# Patient Record
Sex: MALE
[De-identification: names, ages, dates, MRNs, and addresses within clinical notes are randomized per-mention and may not be internally consistent; named-entity substitution may affect disease eponyms.]

---

## 2018-12-07 PROCEDURE — 81001 URINALYSIS AUTO W/SCOPE: CPT | Performed by: UROLOGY

## 2019-06-17 ENCOUNTER — PRIOR ORIGINAL RECORDS (OUTPATIENT)
Dept: OTHER | Age: 69
End: 2019-06-17

## 2019-10-23 ENCOUNTER — PRIOR ORIGINAL RECORDS (OUTPATIENT)
Dept: OTHER | Age: 69
End: 2019-10-23

## 2019-11-05 LAB — SERVICE CMNT-IMP: NORMAL

## 2019-11-13 LAB — GENETICS REPORT: NORMAL

## 2019-11-14 LAB
FLOW SPECIMEN SOURCE (FLWSRC): NORMAL
FLOW SPECIMEN TYPE (FLWTYP): NORMAL
SERVICE CMNT-IMP: NORMAL
SERVICE CMNT-IMP: NORMAL

## 2019-11-23 LAB — PATH REPORT, BONE MARROW PATHOLOGY: NORMAL

## 2019-12-31 ENCOUNTER — PRIOR ORIGINAL RECORDS (OUTPATIENT)
Dept: OTHER | Age: 69
End: 2019-12-31

## 2020-02-21 ENCOUNTER — HOSPITAL (OUTPATIENT)
Dept: OTHER | Age: 70
End: 2020-02-21

## 2020-02-25 ENCOUNTER — PRIOR ORIGINAL RECORDS (OUTPATIENT)
Dept: OTHER | Age: 70
End: 2020-02-25

## 2020-02-25 PROCEDURE — 99212 OFFICE O/P EST SF 10 MIN: CPT | Performed by: INTERNAL MEDICINE

## 2020-04-01 ENCOUNTER — HOSPITAL (OUTPATIENT)
Dept: OTHER | Age: 70
End: 2020-04-01

## 2020-05-01 ENCOUNTER — HOSPITAL (OUTPATIENT)
Dept: OTHER | Age: 70
End: 2020-05-01

## 2020-06-01 ENCOUNTER — HOSPITAL (OUTPATIENT)
Dept: OTHER | Age: 70
End: 2020-06-01

## 2020-07-01 ENCOUNTER — HOSPITAL (OUTPATIENT)
Dept: OTHER | Age: 70
End: 2020-07-01
Attending: INTERNAL MEDICINE

## 2020-08-05 ENCOUNTER — OFFICE VISIT (OUTPATIENT)
Dept: NEPHROLOGY | Facility: CLINIC | Age: 70
End: 2020-08-05
Payer: MEDICARE

## 2020-08-05 VITALS — SYSTOLIC BLOOD PRESSURE: 142 MMHG | WEIGHT: 165.19 LBS | BODY MASS INDEX: 27 KG/M2 | DIASTOLIC BLOOD PRESSURE: 60 MMHG

## 2020-08-05 DIAGNOSIS — N18.30 CKD (CHRONIC KIDNEY DISEASE) STAGE 3, GFR 30-59 ML/MIN (HCC): Primary | ICD-10-CM

## 2020-08-05 DIAGNOSIS — R80.9 PROTEINURIA, UNSPECIFIED TYPE: ICD-10-CM

## 2020-08-05 PROCEDURE — 99204 OFFICE O/P NEW MOD 45 MIN: CPT | Performed by: INTERNAL MEDICINE

## 2020-08-05 RX ORDER — PIOGLITAZONEHYDROCHLORIDE 15 MG/1
15 TABLET ORAL DAILY
COMMUNITY

## 2020-08-05 NOTE — PROGRESS NOTES
Nephrology Consult Note    REASON FOR CONSULT: CKD 3 / proteinuria    ASSESSMENT/PLAN:        1) CKD 3 with proteinuria- remarkably renal function and degree of proteinuria has remained unchanged since I last saw him 10 years ago; serum creatinine remained Elevated creatinine    Aide Avilez DO    Very pleasant 56-year-old male with a history of well-controlled diabetes hypertension who presents for reevaluation of chronic kidney disease;  I saw about 10 years ago and his serum creatinine was 2.0 mg/dL accompa Allergies:  No Known Allergies    Social History:  Social History    Socioeconomic History      Marital status:       Spouse name: Not on file      Number of children: Not on file      Years of education: Not on file      Highest education lev

## 2020-08-06 ENCOUNTER — OFFICE VISIT (OUTPATIENT)
Dept: PODIATRY CLINIC | Facility: CLINIC | Age: 70
End: 2020-08-06
Payer: MEDICARE

## 2020-08-06 DIAGNOSIS — M21.622 TAILOR'S BUNION OF BOTH FEET: Primary | ICD-10-CM

## 2020-08-06 DIAGNOSIS — E11.9 TYPE 2 DIABETES MELLITUS WITHOUT COMPLICATION, WITHOUT LONG-TERM CURRENT USE OF INSULIN (HCC): ICD-10-CM

## 2020-08-06 DIAGNOSIS — M21.621 TAILOR'S BUNION OF BOTH FEET: Primary | ICD-10-CM

## 2020-08-06 PROCEDURE — 99203 OFFICE O/P NEW LOW 30 MIN: CPT | Performed by: PODIATRIST

## 2020-08-06 NOTE — PROGRESS NOTES
Liane Bruce is a 72year old male. Patient presents with:  New Patient: Diabetic type 2 - Last A1C of 5.4 on 6/15/20 - LOV w/ PCP 6/15/20 - painful corn left foot - pain scale 7/10.         HPI:     Presents today as this painful lesion on both feet left is w No          REVIEW OF SYSTEMS:   Review of Systems    Today reviewed systems as documented below  GENERAL HEALTH: feels well otherwise  SKIN: denies any unusual skin lesions or rashes  RESPIRATORY: denies shortness of breath with exertion  CARDIOVASCULAR:

## 2020-10-09 LAB
% SATURATION: 34 % (CALC) (ref 20–48)
ABNORMAL PROTEIN BAND: 0.6 G/DL
ABNORMAL PROTEIN BAND: 0.9 G/DL
ALBUMIN/GLOB SERPL: 1.2 (CALC) (ref 1–2.5)
ALBUMIN/GLOB SERPL: 1.5 (CALC) (ref 1–2.5)
ALBUMIN: 3.4 G/DL (ref 3.8–4.8)
ALBUMIN: 3.5 G/DL (ref 3.6–5.1)
ALBUMIN: 3.7 G/DL (ref 3.8–4.8)
ALBUMIN: 3.8 G/DL (ref 3.6–5.1)
ALKALINE PHOSPHATASE: 38 UNIT/L (ref 40–115)
ALKALINE PHOSPHATASE: 41 UNIT/L (ref 40–115)
ALPHA-1-GLOBULINS: 0.2 G/DL (ref 0.2–0.3)
ALPHA-1-GLOBULINS: 0.4 G/DL (ref 0.2–0.3)
ALPHA-2-GLOBULINS: 0.6 G/DL (ref 0.5–0.9)
ALPHA-2-GLOBULINS: 0.9 G/DL (ref 0.5–0.9)
ALT: 11 UNIT/L (ref 9–46)
ALT: 6 UNIT/L (ref 9–46)
AST: 11 UNIT/L (ref 10–35)
AST: 16 UNIT/L
BASO%: 0.2 %
BASO%: 0.5 %
BASO%: 0.6 %
BASO: 0 10^3/UL
BETA 1 GLOBULIN: 0.4 G/DL (ref 0.4–0.6)
BETA 1 GLOBULIN: 0.4 G/DL (ref 0.4–0.6)
BETA 2 GLOBULIN: 0.3 G/DL (ref 0.2–0.5)
BETA 2 GLOBULIN: 0.3 G/DL (ref 0.2–0.5)
BILIRUBIN, TOTAL: 0.4 MG/DL (ref 0.2–1.2)
BILIRUBIN, TOTAL: 0.6 MG/DL (ref 0.2–1.2)
BUN/CREATININE RATIO: 15 (CALC) (ref 6–22)
BUN/CREATININE RATIO: 18 (CALC) (ref 6–22)
CALCIUM: 9.5 MG/DL (ref 8.6–10.3)
CALCIUM: 9.5 MG/DL (ref 8.6–10.3)
CARBON DIOXIDE: 24 MMOL/L (ref 20–32)
CARBON DIOXIDE: 27 MMOL/L (ref 20–32)
CHLORIDE: 106 MMOL/L (ref 98–110)
CHLORIDE: 107 MMOL/L (ref 98–110)
CRCL (C&G) (MOSAIQ HL): 39.72 ML/MIN
CRCL (C&G) (MOSAIQ HL): 42.78 ML/MIN
CREATININE CLEARANCE (MOSAIQ HL): 32.2 ML/MIN
CREATININE CLEARANCE (MOSAIQ HL): 33.9 ML/MIN
CREATININE: 1.76 MG/DL (ref 0.7–1.25)
CREATININE: 1.85 MG/DL (ref 0.7–1.25)
EGFR AFRICAN AMERICAN: 42 ML/MIN/1.73M2
EGFR AFRICAN AMERICAN: 45 ML/MIN/1.73M2
EGFR NON-AFR. AMERICAN: 37 ML/MIN/1.73M2
EGFR NON-AFR. AMERICAN: 39 ML/MIN/1.73M2
EOS%: 0.7 %
EOS%: 2.3 %
EOS%: 4.2 %
EOS: 0.1 10^3/UL
EOS: 0.1 10^3/UL
EOS: 0.3 10^3/UL
FREE KAPPA, SERUM: 17.3 MG/L (ref 3.3–19.4)
FREE KAPPA, SERUM: 17.7 MG/L (ref 3.3–19.4)
FREE KAPPA/LAMBDA RATIO: 0.83 (ref 0.26–1.65)
FREE KAPPA/LAMBDA RATIO: 0.85 (ref 0.26–1.65)
FREE LAMBDA, SERUM: 20.4 MG/L (ref 5.7–26.3)
FREE LAMBDA, SERUM: 21.3 MG/L (ref 5.7–26.3)
GAMMA GLOBULINS: 1.1 G/DL (ref 0.8–1.7)
GAMMA GLOBULINS: 1.2 G/DL (ref 0.8–1.7)
GLOBULIN: 2.5 G/DL (CALC) (ref 1.9–3.7)
GLOBULIN: 3 G/DL (CALC) (ref 1.9–3.7)
GLUCOSE: 140 MG/DL (ref 65–99)
GLUCOSE: 98 MG/DL (ref 65–99)
HCT: 32.8 % (ref 38–54)
HCT: 34.6 % (ref 38–54)
HCT: 34.9 % (ref 38–54)
HGB: 10.6 G/DL (ref 12–18)
HGB: 11.3 G/DL (ref 12–18)
HGB: 11.4 G/DL (ref 12–18)
IG A: 136 MG/DL (ref 20–320)
IG G: 1347 MG/DL (ref 600–1540)
IG M: 35 MG/DL (ref 50–300)
INTERPRETATION: ABNORMAL
INTERPRETATION: ABNORMAL
INTERPRETATION: NORMAL
IRON BINDING CAPACITY: 334 MCG/DL (CALC) (ref 250–425)
IRON, TOTAL: 115 MCG/DL (ref 50–180)
LYMPH%: 22.8 % (ref 12–44)
LYMPH%: 29.4 % (ref 12–44)
LYMPH%: 9.2 % (ref 12–44)
LYMPH: 1 10^3/UL (ref 0.8–2.8)
LYMPH: 1.4 10^3/UL (ref 0.8–2.8)
LYMPH: 1.6 10^3/UL (ref 0.8–2.8)
MCH: 30.6 PG (ref 26–33)
MCH: 30.9 PG (ref 26–33)
MCH: 31 PG (ref 26–33)
MCHC: 32.3 G/DL (ref 31–36)
MCHC: 32.4 G/DL (ref 31–36)
MCHC: 32.9 G/DL (ref 31–36)
MCV: 94 FML (ref 82–100)
MCV: 94.6 FML (ref 82–100)
MCV: 95.6 FML (ref 82–100)
MONO%: 8 % (ref 2–12)
MONO%: 8.5 % (ref 2–12)
MONO%: 9.6 % (ref 2–12)
MONO: 0.5 10^3/UL (ref 0.2–1)
MONO: 0.6 10^3/UL (ref 0.2–1)
MONO: 0.9 10^3/UL (ref 0.2–1)
MPV: 9.4 FML (ref 8.6–11.7)
MPV: 9.5 FML (ref 8.6–11.7)
MPV: 9.7 FML (ref 8.6–11.7)
NEUT%: 59.2 % (ref 47–76)
NEUT%: 62.9 % (ref 47–76)
NEUT%: 81.9 % (ref 47–76)
NEUT: 3.2 10^3/UL (ref 1.5–7.1)
NEUT: 3.7 10^3/UL (ref 1.5–7.1)
NEUT: 9.2 10^3/UL (ref 1.5–7.1)
PLT: 308 10^3/UL (ref 150–375)
PLT: 324 10^3/UL (ref 150–375)
PLT: 355 10^3/UL (ref 150–375)
POTASSIUM: 4.3 MMOL/L (ref 3.5–5.3)
POTASSIUM: 4.5 MMOL/L (ref 3.5–5.3)
PROTEIN, TOTAL: 6.3 G/DL (ref 6.1–8.1)
PROTEIN, TOTAL: 6.3 G/DL (ref 6.1–8.1)
PROTEIN, TOTAL: 6.5 G/DL (ref 6.1–8.1)
PROTEIN, TOTAL: 6.5 G/DL (ref 6.1–8.1)
RBC: 3.43 10^6/UL (ref 4.2–6.2)
RBC: 3.68 10^6/UL (ref 4.2–6.2)
RBC: 3.69 10^6/UL (ref 4.2–6.2)
RDW-CV: 13.3 %
RDW-CV: 13.6 %
RDW-CV: 13.9 %
RDW-SD: 44.2 FML (ref 36–50)
RDW-SD: 45.1 FML (ref 36–50)
RDW-SD: 45.8 FML (ref 36–50)
RETICULOCYTE COUNT,$AUTOMATED: 0.9 %
RETICULOCYTE, ABSOLUTE: NORMAL CELLS/UL (ref 25000–90000)
SODIUM: 140 MMOL/L (ref 135–146)
SODIUM: 140 MMOL/L (ref 135–146)
UREA NITROGEN (BUN): 27 MG/DL (ref 7–25)
UREA NITROGEN (BUN): 31 MG/DL (ref 7–25)
WBC: 11.3 10^3/UL (ref 4.3–11)
WBC: 5.3 10^3/UL (ref 4.3–11)
WBC: 5.9 10^3/UL (ref 4.3–11)

## 2020-10-11 VITALS
DIASTOLIC BLOOD PRESSURE: 62 MMHG | WEIGHT: 167.2 LBS | BODY MASS INDEX: 26.87 KG/M2 | HEIGHT: 66 IN | SYSTOLIC BLOOD PRESSURE: 128 MMHG

## 2020-10-11 VITALS
BODY MASS INDEX: 26.68 KG/M2 | HEIGHT: 66 IN | DIASTOLIC BLOOD PRESSURE: 74 MMHG | WEIGHT: 166.01 LBS | SYSTOLIC BLOOD PRESSURE: 141 MMHG

## 2020-10-11 VITALS — SYSTOLIC BLOOD PRESSURE: 139 MMHG | DIASTOLIC BLOOD PRESSURE: 74 MMHG | WEIGHT: 161.99 LBS

## 2020-12-31 ENCOUNTER — PRIOR ORIGINAL RECORDS (OUTPATIENT)
Dept: OTHER | Age: 70
End: 2020-12-31

## 2021-01-01 ENCOUNTER — EXTERNAL RECORD (OUTPATIENT)
Dept: INFUSION THERAPY | Age: 71
End: 2021-01-01

## 2021-01-29 ENCOUNTER — MED REC SCAN ONLY (OUTPATIENT)
Dept: NEPHROLOGY | Facility: CLINIC | Age: 71
End: 2021-01-29

## 2021-03-08 DIAGNOSIS — Z23 NEED FOR VACCINATION: ICD-10-CM

## 2021-07-22 ENCOUNTER — MED REC SCAN ONLY (OUTPATIENT)
Dept: NEPHROLOGY | Facility: CLINIC | Age: 71
End: 2021-07-22

## 2021-08-22 ENCOUNTER — LAB ENCOUNTER (OUTPATIENT)
Dept: LAB | Facility: HOSPITAL | Age: 71
End: 2021-08-22
Attending: INTERNAL MEDICINE
Payer: MEDICARE

## 2021-08-22 DIAGNOSIS — Z01.818 PREOP TESTING: ICD-10-CM

## 2021-08-23 LAB — SARS-COV-2 RNA RESP QL NAA+PROBE: NOT DETECTED

## 2021-08-25 PROBLEM — Z86.010 HISTORY OF ADENOMATOUS POLYP OF COLON: Status: ACTIVE | Noted: 2021-08-25

## 2021-08-25 PROBLEM — Z86.0101 HISTORY OF ADENOMATOUS POLYP OF COLON: Status: ACTIVE | Noted: 2021-08-25

## 2022-11-21 ENCOUNTER — OFFICE VISIT (OUTPATIENT)
Dept: NEPHROLOGY | Facility: CLINIC | Age: 72
End: 2022-11-21
Payer: MEDICARE

## 2022-11-21 VITALS — DIASTOLIC BLOOD PRESSURE: 48 MMHG | SYSTOLIC BLOOD PRESSURE: 116 MMHG | WEIGHT: 153.5 LBS | BODY MASS INDEX: 25 KG/M2

## 2022-11-21 DIAGNOSIS — N05.9 GLOMERULONEPHRITIS: ICD-10-CM

## 2022-11-21 DIAGNOSIS — R80.9 PROTEINURIA, UNSPECIFIED TYPE: ICD-10-CM

## 2022-11-21 DIAGNOSIS — N18.30 STAGE 3 CHRONIC KIDNEY DISEASE, UNSPECIFIED WHETHER STAGE 3A OR 3B CKD (HCC): Primary | ICD-10-CM

## 2023-08-04 ENCOUNTER — HOSPITAL ENCOUNTER (OUTPATIENT)
Dept: ULTRASOUND IMAGING | Age: 73
Discharge: HOME | End: 2023-08-04
Attending: PHYSICIAN ASSISTANT
Payer: MEDICARE

## 2023-08-04 DIAGNOSIS — I10 ESSENTIAL HYPERTENSION: ICD-10-CM

## 2023-08-04 PROCEDURE — 93975 VASCULAR STUDY: CPT | Performed by: PHYSICIAN ASSISTANT

## 2023-08-04 PROCEDURE — 76775 US EXAM ABDO BACK WALL LIM: CPT | Performed by: PHYSICIAN ASSISTANT

## 2023-09-20 ENCOUNTER — OFFICE VISIT (OUTPATIENT)
Dept: NEPHROLOGY | Facility: CLINIC | Age: 73
End: 2023-09-20
Payer: MEDICARE

## 2023-09-20 VITALS — DIASTOLIC BLOOD PRESSURE: 62 MMHG | SYSTOLIC BLOOD PRESSURE: 150 MMHG | WEIGHT: 159 LBS | BODY MASS INDEX: 26 KG/M2

## 2023-09-20 DIAGNOSIS — I10 PRIMARY HYPERTENSION: ICD-10-CM

## 2023-09-20 DIAGNOSIS — R80.9 PROTEINURIA, UNSPECIFIED TYPE: ICD-10-CM

## 2023-09-20 DIAGNOSIS — N05.9 GLOMERULONEPHRITIS: ICD-10-CM

## 2023-09-20 DIAGNOSIS — N18.30 STAGE 3 CHRONIC KIDNEY DISEASE, UNSPECIFIED WHETHER STAGE 3A OR 3B CKD (HCC): Primary | ICD-10-CM

## 2023-09-20 PROCEDURE — 99214 OFFICE O/P EST MOD 30 MIN: CPT | Performed by: INTERNAL MEDICINE

## 2023-09-20 RX ORDER — AMLODIPINE BESYLATE 10 MG/1
10 TABLET ORAL DAILY
Qty: 30 TABLET | Refills: 1 | Status: SHIPPED | OUTPATIENT
Start: 2023-09-20

## 2023-09-20 RX ORDER — VALSARTAN AND HYDROCHLOROTHIAZIDE 320; 25 MG/1; MG/1
1 TABLET, FILM COATED ORAL DAILY
Qty: 30 TABLET | Refills: 11 | Status: SHIPPED | OUTPATIENT
Start: 2023-09-20

## 2023-11-02 RX ORDER — AMLODIPINE BESYLATE 10 MG/1
10 TABLET ORAL DAILY
Qty: 90 TABLET | Refills: 1 | Status: SHIPPED | OUTPATIENT
Start: 2023-11-02

## 2023-12-13 ENCOUNTER — TELEPHONE (OUTPATIENT)
Dept: HEMATOLOGY/ONCOLOGY | Facility: HOSPITAL | Age: 73
End: 2023-12-13

## 2023-12-16 ENCOUNTER — PATIENT MESSAGE (OUTPATIENT)
Dept: NEPHROLOGY | Facility: CLINIC | Age: 73
End: 2023-12-16

## 2023-12-16 DIAGNOSIS — N18.4 CKD (CHRONIC KIDNEY DISEASE) STAGE 4, GFR 15-29 ML/MIN (HCC): Primary | ICD-10-CM

## 2023-12-16 DIAGNOSIS — N17.9 AKI (ACUTE KIDNEY INJURY) (HCC): ICD-10-CM

## 2023-12-29 ENCOUNTER — OFFICE VISIT (OUTPATIENT)
Dept: HEMATOLOGY/ONCOLOGY | Facility: HOSPITAL | Age: 73
End: 2023-12-29
Attending: INTERNAL MEDICINE
Payer: MEDICARE

## 2023-12-29 VITALS — DIASTOLIC BLOOD PRESSURE: 62 MMHG | SYSTOLIC BLOOD PRESSURE: 148 MMHG | HEART RATE: 76 BPM

## 2023-12-29 VITALS
RESPIRATION RATE: 18 BRPM | WEIGHT: 159.63 LBS | OXYGEN SATURATION: 99 % | DIASTOLIC BLOOD PRESSURE: 67 MMHG | SYSTOLIC BLOOD PRESSURE: 153 MMHG | TEMPERATURE: 97 F | HEART RATE: 88 BPM | BODY MASS INDEX: 26 KG/M2

## 2023-12-29 DIAGNOSIS — D63.1 ANEMIA DUE TO STAGE 4 CHRONIC KIDNEY DISEASE  (HCC): ICD-10-CM

## 2023-12-29 DIAGNOSIS — D50.8 IRON DEFICIENCY ANEMIA SECONDARY TO INADEQUATE DIETARY IRON INTAKE: Primary | ICD-10-CM

## 2023-12-29 DIAGNOSIS — N18.4 ANEMIA DUE TO STAGE 4 CHRONIC KIDNEY DISEASE  (HCC): ICD-10-CM

## 2023-12-29 DIAGNOSIS — N18.4 CKD (CHRONIC KIDNEY DISEASE) STAGE 4, GFR 15-29 ML/MIN (HCC): ICD-10-CM

## 2023-12-29 PROCEDURE — 96374 THER/PROPH/DIAG INJ IV PUSH: CPT

## 2023-12-29 PROCEDURE — 99205 OFFICE O/P NEW HI 60 MIN: CPT | Performed by: INTERNAL MEDICINE

## 2023-12-29 RX ORDER — KETOTIFEN FUMARATE 0.35 MG/ML
SOLUTION/ DROPS OPHTHALMIC
COMMUNITY

## 2023-12-29 NOTE — PROGRESS NOTES
Education Record    Learner:  Patient and Spouse    Disease / Diagnosis: iron deficiency anemia    Barriers / Limitations:  None   Comments:    Method:  Discussion and Printed material   Comments:    General Topics:  Medication, Side effects and symptom management, Plan of care reviewed, and Fall risk and prevention   Comments:    Outcome:  Shows understanding   Comments:    Pt here for injectafer iron infusion. Tolerated well. Pt to return next week for second dose, then have labs completed in 3 months outpatient. Pt and spouse provided with list of outpatient labs. Verbalized understanding of plan of care. Discharged in stable condition.

## 2023-12-29 NOTE — PROGRESS NOTES
Education Record    Learner:  Patient    Disease / Diagnosis:iron deficiency anemia    Barriers / Limitations:  None   Comments:    Method:  Discussion   Comments:    General Topics:  Plan of care reviewed   Comments:    Outcome:  Shows understanding   Comments:    Patient here as a new consult. States energy levels are low. Feels cold often. Denies any dyspnea, lightheadedness, bleeding, or bruising. States he cannot tolerate oral iron.

## 2024-01-02 NOTE — TELEPHONE ENCOUNTER
From: Maile Vincent  To: Miguel Rees  Sent: 12/16/2023 6:34 AM CST  Subject: Labs    Mr. Rees-     Hope you are well. Your labs show mild anemia and lower iron levels (common). Your kidney function (creatinine) is higher (worse). Have there been any recent additions to your medications ie diuretics, jardiance / farxiga, etc?    Pwaan Vincent

## 2024-01-05 ENCOUNTER — OFFICE VISIT (OUTPATIENT)
Dept: HEMATOLOGY/ONCOLOGY | Facility: HOSPITAL | Age: 74
End: 2024-01-05
Attending: INTERNAL MEDICINE
Payer: MEDICARE

## 2024-01-05 VITALS
OXYGEN SATURATION: 100 % | DIASTOLIC BLOOD PRESSURE: 77 MMHG | SYSTOLIC BLOOD PRESSURE: 128 MMHG | HEART RATE: 59 BPM | TEMPERATURE: 97 F | RESPIRATION RATE: 16 BRPM

## 2024-01-05 DIAGNOSIS — D50.8 IRON DEFICIENCY ANEMIA SECONDARY TO INADEQUATE DIETARY IRON INTAKE: Primary | ICD-10-CM

## 2024-01-05 PROCEDURE — 96374 THER/PROPH/DIAG INJ IV PUSH: CPT

## 2024-01-05 NOTE — PROGRESS NOTES
Pt here for iron infusion.  Arrives Ambulating independently, accompanied by Other self           Modifications in dose or schedule: No     Frequency of blood return and site check throughout administration: Prior to administration   Discharged to Home, Ambulating independently, accompanied by:Other self    Outpatient Oncology Care Plan  Problem list:  anemia  Problems related to:    disease/disease progression  Interventions:  administered iron  Expected outcomes:  optimal lab values  Progress towards outcome:  making progress    Education Record    Learner:  Patient  Barriers / Limitations:  None  Method:  Brief focused  Outcome:  Shows understanding  Comments:  Here for second dose of injectafer. Tolerated well. Lab appt scheduled for three months from today.

## 2024-01-10 ENCOUNTER — LAB ENCOUNTER (OUTPATIENT)
Dept: LAB | Age: 74
End: 2024-01-10
Attending: INTERNAL MEDICINE
Payer: MEDICARE

## 2024-01-10 LAB
ANION GAP SERPL CALC-SCNC: 7 MMOL/L (ref 0–18)
BUN BLD-MCNC: 71 MG/DL (ref 9–23)
CALCIUM BLD-MCNC: 8.7 MG/DL (ref 8.5–10.1)
CHLORIDE SERPL-SCNC: 104 MMOL/L (ref 98–112)
CO2 SERPL-SCNC: 24 MMOL/L (ref 21–32)
CREAT BLD-MCNC: 3.28 MG/DL
EGFRCR SERPLBLD CKD-EPI 2021: 19 ML/MIN/1.73M2 (ref 60–?)
FASTING STATUS PATIENT QL REPORTED: NO
GLUCOSE BLD-MCNC: 96 MG/DL (ref 70–99)
OSMOLALITY SERPL CALC.SUM OF ELEC: 301 MOSM/KG (ref 275–295)
POTASSIUM SERPL-SCNC: 4.6 MMOL/L (ref 3.5–5.1)
SODIUM SERPL-SCNC: 135 MMOL/L (ref 136–145)

## 2024-01-10 PROCEDURE — 36415 COLL VENOUS BLD VENIPUNCTURE: CPT | Performed by: INTERNAL MEDICINE

## 2024-01-10 PROCEDURE — 80048 BASIC METABOLIC PNL TOTAL CA: CPT | Performed by: INTERNAL MEDICINE

## 2024-01-17 ENCOUNTER — OFFICE VISIT (OUTPATIENT)
Dept: NEPHROLOGY | Facility: CLINIC | Age: 74
End: 2024-01-17
Payer: MEDICARE

## 2024-01-17 VITALS — WEIGHT: 152.38 LBS | BODY MASS INDEX: 25 KG/M2 | DIASTOLIC BLOOD PRESSURE: 56 MMHG | SYSTOLIC BLOOD PRESSURE: 110 MMHG

## 2024-01-17 DIAGNOSIS — N17.9 AKI (ACUTE KIDNEY INJURY) (HCC): Primary | ICD-10-CM

## 2024-01-17 DIAGNOSIS — N18.30 STAGE 3 CHRONIC KIDNEY DISEASE, UNSPECIFIED WHETHER STAGE 3A OR 3B CKD (HCC): ICD-10-CM

## 2024-01-17 DIAGNOSIS — I10 PRIMARY HYPERTENSION: ICD-10-CM

## 2024-01-17 PROCEDURE — 99214 OFFICE O/P EST MOD 30 MIN: CPT | Performed by: INTERNAL MEDICINE

## 2024-01-17 RX ORDER — PREDNISOLONE ACETATE 10 MG/ML
SUSPENSION/ DROPS OPHTHALMIC
COMMUNITY

## 2024-01-17 NOTE — PROGRESS NOTES
Nephrology Progress Note      ASSESSMENT/PLAN:      1) CKD 3 with proteinuria- longstanding CKD with initial Cr 2.0 mg/dl 2011 accompanied by modest subnephrotic range proteinuria has been relatively stable; most recent creatinine level was 2.3 mg/dL.  This is most consistent with either prior insult such as postinfectious glomerulonephritis or a low-grade glomerulopathy such as membranous glomerulonephritis or FSGS.  There is no evidence of a nephritic process given his consistently bland urine sediment.  Previous evaluation with serologies and monoclonal protein study was unrevealing.  He also underwent bone marrow biopsy by Dr. Marks in 2020 which did not reveal a plasma cell dyscrasia.  He does not have other risk factors for kidney disease and has well-controlled hypertension and diabetes.  Meds are benign without chronic analgesic or PPI use.  There is no history of obstructive uropathy. PLAN- d/w pt and wife at length. Recently higher Cr likely due to addition of thiazide (losartan -> valsartan-thiazide); other meds unchanged. No other acute insults. Will recheck labs next week after holding thiazide x 3-4 days and defer further w/u if Cr returns to baseline.     2) DM 2- well controlled on actos with A1C < 6. I do not feel he has an absolute need to switch from Actos to SGLT2 given stable kidney function, lack of diabetic nephropathy, and excellent DM mgmt- particularly given the cost.     3) HTN- BP significantly better with valsartan-thiazide- resume pending above. Continue usual amlodipine 10 mg daily        HPI:   Miguel Rees is a 73 year old male who presents for follow-up of   Chief Complaint   Patient presents with    Chronic Kidney Disease     Ref'd back by Toro Fragoso PA-C for elevated Cr    Hypertension       Very pleasant 73-year-old male presents for follow-up of chronic kidney disease; send serum creatinine higher to 3.2 mg/dL range.  No changes medications was switching from losartan to  valsartan plus hydrochlorothiazide about 3 months ago for BP management.  Denies any recent NSAID use.  No obstructive symptoms.  No other changes in medications.  Otherwise feels fine.    HISTORY:  Past Medical History:   Diagnosis Date    Anemia 2019    Diabetes mellitus (HCC)     Disorder of prostate 2010    enlarged prostate    Glaucoma 2010    minor    Hearing loss     High cholesterol     Type II or unspecified type diabetes mellitus without mention of complication, not stated as uncontrolled     Unspecified essential hypertension     Wears glasses       Past Surgical History:   Procedure Laterality Date    CHOLECYSTECTOMY  11/2001    COLONOSCOPY  08/24/2019    REMOVAL GALLBLADDER        Family History   Problem Relation Age of Onset    Diabetes Father       Social History:   Social History     Socioeconomic History    Marital status:    Tobacco Use    Smoking status: Never    Smokeless tobacco: Never   Vaping Use    Vaping Use: Never used   Substance and Sexual Activity    Alcohol use: No    Drug use: No        Medications (Active prior to today's visit):  Current Outpatient Medications   Medication Sig Dispense Refill    prednisoLONE 1 % Ophthalmic Suspension INSTILL 1 DROP IN SURGERY EYE 4 TIMES DAILY AFTER SURGERY FOR 1 WEEK, THEN 1 DROP IN SURGERY EYE THREE TIMES DAILY FOR 1 WEEK, THEN 1 DROP IN SURGERY EYE 2 TIMES DAILY FOR 1 WEEK ,THEN 1 DROP IN SURGERY EYE ONCE DAILY FOR 1 MORE WEEK.      amLODIPine 10 MG Oral Tab Take 1 tablet (10 mg total) by mouth daily. 90 tablet 1    Valsartan-hydroCHLOROthiazide 320-25 MG Oral Tab Take 1 tablet by mouth daily. 30 tablet 11    Pioglitazone HCl 15 MG Oral Tab Take 1 tablet (15 mg total) by mouth daily.      Fluticasone Propionate 50 MCG/ACT Nasal Suspension by Each Nare route daily.      Cholecalciferol (VITAMIN D) 1000 units Oral Tab Take by mouth. 2000mg       Vitamin B-12 1000 MCG Oral Tab Take 1 tablet (1,000 mcg total) by mouth daily.      aspirin 81 MG  Oral Tab Take 1 tablet (81 mg total) by mouth daily.      fenofibrate micronized (LOFIBRA) 134 MG Oral Cap Take 1 capsule (134 mg total) by mouth daily with breakfast.      ketotifen (ALAWAY) 0.035 % Ophthalmic Solution  (Patient not taking: Reported on 1/17/2024)         Allergies:  No Known Allergies    ROS:     Denies fever/chills  Denies wt loss/gain  Denies HA or visual changes  Denies CP or palpitations  Denies SOB/cough/hemoptysis  Denies abd or flank pain  Denies N/V/D  Denies change in urinary habits or gross hematuria  Denies LE edema  Denies skin rashes/myalgias/arthralgias      PHYSICAL EXAM:   /56 (BP Location: Left arm, Patient Position: Sitting)   Wt 152 lb 6 oz (69.1 kg)   BMI 24.59 kg/m²   Wt Readings from Last 3 Encounters:   01/17/24 152 lb 6 oz (69.1 kg)   12/29/23 159 lb 9.6 oz (72.4 kg)   09/20/23 159 lb (72.1 kg)     General: Alert and oriented in no apparent distress.  HEENT: No scleral icterus, MMM  Neck: Supple, no DEANNE or thyromegaly  Cardiac: Regular rate and rhythm, S1, S2 normal, no murmur or rub  Lungs: Clear without wheezes, rales, rhonchi.    Abdomen: Soft, non-tender. + bowel sounds, no palpable organomegaly  Extremities: Without clubbing, cyanosis or edema.  Neurologic: Alert and oriented, normal affect, cranial nerves grossly intact, moving all extremities  Skin: Warm and dry, no rashes      Maile Vincent MD  1/17/2024  236 PM

## 2024-01-31 ENCOUNTER — LAB ENCOUNTER (OUTPATIENT)
Dept: LAB | Age: 74
End: 2024-01-31
Attending: INTERNAL MEDICINE
Payer: MEDICARE

## 2024-01-31 DIAGNOSIS — N17.9 AKI (ACUTE KIDNEY INJURY) (HCC): ICD-10-CM

## 2024-01-31 DIAGNOSIS — N18.30 STAGE 3 CHRONIC KIDNEY DISEASE, UNSPECIFIED WHETHER STAGE 3A OR 3B CKD (HCC): ICD-10-CM

## 2024-01-31 DIAGNOSIS — I10 PRIMARY HYPERTENSION: ICD-10-CM

## 2024-01-31 LAB
ANION GAP SERPL CALC-SCNC: 5 MMOL/L (ref 0–18)
BILIRUB UR QL STRIP.AUTO: NEGATIVE
BUN BLD-MCNC: 49 MG/DL (ref 9–23)
CALCIUM BLD-MCNC: 9.1 MG/DL (ref 8.5–10.1)
CHLORIDE SERPL-SCNC: 108 MMOL/L (ref 98–112)
CLARITY UR REFRACT.AUTO: CLEAR
CO2 SERPL-SCNC: 26 MMOL/L (ref 21–32)
COLOR UR AUTO: COLORLESS
CREAT BLD-MCNC: 2.9 MG/DL
CREAT UR-SCNC: 21.4 MG/DL
EGFRCR SERPLBLD CKD-EPI 2021: 22 ML/MIN/1.73M2 (ref 60–?)
FASTING STATUS PATIENT QL REPORTED: NO
GLUCOSE BLD-MCNC: 100 MG/DL (ref 70–99)
GLUCOSE UR STRIP.AUTO-MCNC: 50 MG/DL
KETONES UR STRIP.AUTO-MCNC: NEGATIVE MG/DL
LEUKOCYTE ESTERASE UR QL STRIP.AUTO: NEGATIVE
NITRITE UR QL STRIP.AUTO: NEGATIVE
OSMOLALITY SERPL CALC.SUM OF ELEC: 301 MOSM/KG (ref 275–295)
PH UR STRIP.AUTO: 6 [PH] (ref 5–8)
POTASSIUM SERPL-SCNC: 5 MMOL/L (ref 3.5–5.1)
PROT UR STRIP.AUTO-MCNC: 20 MG/DL
PROT UR-MCNC: 31.9 MG/DL
RBC UR QL AUTO: NEGATIVE
SODIUM SERPL-SCNC: 139 MMOL/L (ref 136–145)
SP GR UR STRIP.AUTO: <1.005 (ref 1–1.03)
UROBILINOGEN UR STRIP.AUTO-MCNC: NORMAL MG/DL

## 2024-01-31 PROCEDURE — 82570 ASSAY OF URINE CREATININE: CPT

## 2024-01-31 PROCEDURE — 84156 ASSAY OF PROTEIN URINE: CPT

## 2024-01-31 PROCEDURE — 80048 BASIC METABOLIC PNL TOTAL CA: CPT | Performed by: INTERNAL MEDICINE

## 2024-01-31 PROCEDURE — 36415 COLL VENOUS BLD VENIPUNCTURE: CPT | Performed by: INTERNAL MEDICINE

## 2024-01-31 PROCEDURE — 81001 URINALYSIS AUTO W/SCOPE: CPT

## 2024-02-07 ENCOUNTER — PATIENT MESSAGE (OUTPATIENT)
Dept: NEPHROLOGY | Facility: CLINIC | Age: 74
End: 2024-02-07

## 2024-02-07 DIAGNOSIS — N18.4 CKD (CHRONIC KIDNEY DISEASE) STAGE 4, GFR 15-29 ML/MIN (HCC): Primary | ICD-10-CM

## 2024-02-08 NOTE — TELEPHONE ENCOUNTER
From: Miguel Rees  To: Maile Vincent  Sent: 2/7/2024 6:38 PM CST  Subject: Blood pressure increasing     Hi Dr Vincent, I have been off the valsartan since 1/28. My blood pressure was ok the week of 1/28. This week my blood pressure has been increasing mostly in the 138/61 range in the morning and 145/91 in the evenings. You said to stay off the valsartan for two weeks from my email to you on 2/1. Should I continue for another week off the valsartan or should I start the valsartan back up since my blood pressure is increasing. Thanks, Miguel Rees

## 2024-02-29 ENCOUNTER — LAB ENCOUNTER (OUTPATIENT)
Dept: LAB | Age: 74
End: 2024-02-29
Attending: INTERNAL MEDICINE
Payer: MEDICARE

## 2024-02-29 LAB
ANION GAP SERPL CALC-SCNC: 1 MMOL/L (ref 0–18)
BUN BLD-MCNC: 61 MG/DL (ref 9–23)
CALCIUM BLD-MCNC: 9.1 MG/DL (ref 8.5–10.1)
CHLORIDE SERPL-SCNC: 109 MMOL/L (ref 98–112)
CO2 SERPL-SCNC: 26 MMOL/L (ref 21–32)
CREAT BLD-MCNC: 3.23 MG/DL
EGFRCR SERPLBLD CKD-EPI 2021: 20 ML/MIN/1.73M2 (ref 60–?)
FASTING STATUS PATIENT QL REPORTED: NO
GLUCOSE BLD-MCNC: 106 MG/DL (ref 70–99)
OSMOLALITY SERPL CALC.SUM OF ELEC: 300 MOSM/KG (ref 275–295)
POTASSIUM SERPL-SCNC: 5.2 MMOL/L (ref 3.5–5.1)
SODIUM SERPL-SCNC: 136 MMOL/L (ref 136–145)

## 2024-02-29 PROCEDURE — 36415 COLL VENOUS BLD VENIPUNCTURE: CPT | Performed by: INTERNAL MEDICINE

## 2024-02-29 PROCEDURE — 80048 BASIC METABOLIC PNL TOTAL CA: CPT | Performed by: INTERNAL MEDICINE

## 2024-03-01 ENCOUNTER — PATIENT MESSAGE (OUTPATIENT)
Dept: NEPHROLOGY | Facility: CLINIC | Age: 74
End: 2024-03-01

## 2024-03-01 DIAGNOSIS — N18.4 CKD (CHRONIC KIDNEY DISEASE) STAGE 4, GFR 15-29 ML/MIN (HCC): Primary | ICD-10-CM

## 2024-03-01 DIAGNOSIS — E87.5 HYPERKALEMIA: ICD-10-CM

## 2024-04-05 ENCOUNTER — APPOINTMENT (OUTPATIENT)
Dept: HEMATOLOGY/ONCOLOGY | Facility: HOSPITAL | Age: 74
End: 2024-04-05
Attending: INTERNAL MEDICINE
Payer: MEDICARE

## 2024-04-05 ENCOUNTER — LAB ENCOUNTER (OUTPATIENT)
Dept: LAB | Age: 74
End: 2024-04-05
Attending: INTERNAL MEDICINE
Payer: MEDICARE

## 2024-04-05 DIAGNOSIS — N18.4 CKD (CHRONIC KIDNEY DISEASE) STAGE 4, GFR 15-29 ML/MIN (HCC): ICD-10-CM

## 2024-04-05 DIAGNOSIS — E87.5 HYPERKALEMIA: ICD-10-CM

## 2024-04-05 DIAGNOSIS — D50.8 IRON DEFICIENCY ANEMIA SECONDARY TO INADEQUATE DIETARY IRON INTAKE: ICD-10-CM

## 2024-04-05 LAB
ANION GAP SERPL CALC-SCNC: 5 MMOL/L (ref 0–18)
BASOPHILS # BLD AUTO: 0.03 X10(3) UL (ref 0–0.2)
BASOPHILS NFR BLD AUTO: 0.6 %
BUN BLD-MCNC: 77 MG/DL (ref 9–23)
CALCIUM BLD-MCNC: 9.1 MG/DL (ref 8.5–10.1)
CHLORIDE SERPL-SCNC: 107 MMOL/L (ref 98–112)
CO2 SERPL-SCNC: 24 MMOL/L (ref 21–32)
CREAT BLD-MCNC: 3.85 MG/DL
DEPRECATED HBV CORE AB SER IA-ACNC: 672.2 NG/ML
EGFRCR SERPLBLD CKD-EPI 2021: 16 ML/MIN/1.73M2 (ref 60–?)
EOSINOPHIL # BLD AUTO: 0.21 X10(3) UL (ref 0–0.7)
EOSINOPHIL NFR BLD AUTO: 4.5 %
ERYTHROCYTE [DISTWIDTH] IN BLOOD BY AUTOMATED COUNT: 13.4 %
FASTING STATUS PATIENT QL REPORTED: YES
GLUCOSE BLD-MCNC: 93 MG/DL (ref 70–99)
HCT VFR BLD AUTO: 32.8 %
HGB BLD-MCNC: 10.4 G/DL
IMM GRANULOCYTES # BLD AUTO: 0.02 X10(3) UL (ref 0–1)
IMM GRANULOCYTES NFR BLD: 0.4 %
IRON SATN MFR SERPL: 34 %
IRON SERPL-MCNC: 138 UG/DL
LYMPHOCYTES # BLD AUTO: 1.11 X10(3) UL (ref 1–4)
LYMPHOCYTES NFR BLD AUTO: 23.7 %
MCH RBC QN AUTO: 31 PG (ref 26–34)
MCHC RBC AUTO-ENTMCNC: 31.7 G/DL (ref 31–37)
MCV RBC AUTO: 97.9 FL
MONOCYTES # BLD AUTO: 0.35 X10(3) UL (ref 0.1–1)
MONOCYTES NFR BLD AUTO: 7.5 %
NEUTROPHILS # BLD AUTO: 2.96 X10 (3) UL (ref 1.5–7.7)
NEUTROPHILS # BLD AUTO: 2.96 X10(3) UL (ref 1.5–7.7)
NEUTROPHILS NFR BLD AUTO: 63.3 %
OSMOLALITY SERPL CALC.SUM OF ELEC: 305 MOSM/KG (ref 275–295)
PLATELET # BLD AUTO: 443 10(3)UL (ref 150–450)
POTASSIUM SERPL-SCNC: 4.6 MMOL/L (ref 3.5–5.1)
RBC # BLD AUTO: 3.35 X10(6)UL
SODIUM SERPL-SCNC: 136 MMOL/L (ref 136–145)
TIBC SERPL-MCNC: 404 UG/DL (ref 240–450)
TRANSFERRIN SERPL-MCNC: 271 MG/DL (ref 200–360)
WBC # BLD AUTO: 4.7 X10(3) UL (ref 4–11)

## 2024-04-05 PROCEDURE — 82728 ASSAY OF FERRITIN: CPT

## 2024-04-05 PROCEDURE — 83540 ASSAY OF IRON: CPT

## 2024-04-05 PROCEDURE — 83550 IRON BINDING TEST: CPT

## 2024-04-05 PROCEDURE — 36415 COLL VENOUS BLD VENIPUNCTURE: CPT

## 2024-04-05 PROCEDURE — 85025 COMPLETE CBC W/AUTO DIFF WBC: CPT

## 2024-04-05 PROCEDURE — 80048 BASIC METABOLIC PNL TOTAL CA: CPT

## 2024-04-19 RX ORDER — AMLODIPINE BESYLATE 10 MG/1
10 TABLET ORAL DAILY
Qty: 90 TABLET | Refills: 1 | Status: SHIPPED | OUTPATIENT
Start: 2024-04-19

## 2024-05-14 ENCOUNTER — LAB ENCOUNTER (OUTPATIENT)
Dept: LAB | Age: 74
End: 2024-05-14
Attending: INTERNAL MEDICINE

## 2024-05-14 DIAGNOSIS — E87.5 HYPERKALEMIA: ICD-10-CM

## 2024-05-14 DIAGNOSIS — N18.4 CKD (CHRONIC KIDNEY DISEASE) STAGE 4, GFR 15-29 ML/MIN (HCC): ICD-10-CM

## 2024-05-14 LAB
ANION GAP SERPL CALC-SCNC: 7 MMOL/L (ref 0–18)
BUN BLD-MCNC: 86 MG/DL (ref 9–23)
CALCIUM BLD-MCNC: 8.7 MG/DL (ref 8.5–10.1)
CHLORIDE SERPL-SCNC: 110 MMOL/L (ref 98–112)
CO2 SERPL-SCNC: 21 MMOL/L (ref 21–32)
CREAT BLD-MCNC: 3.56 MG/DL
EGFRCR SERPLBLD CKD-EPI 2021: 17 ML/MIN/1.73M2 (ref 60–?)
FASTING STATUS PATIENT QL REPORTED: NO
GLUCOSE BLD-MCNC: 105 MG/DL (ref 70–99)
OSMOLALITY SERPL CALC.SUM OF ELEC: 313 MOSM/KG (ref 275–295)
POTASSIUM SERPL-SCNC: 4.7 MMOL/L (ref 3.5–5.1)
SODIUM SERPL-SCNC: 138 MMOL/L (ref 136–145)

## 2024-05-14 PROCEDURE — 36415 COLL VENOUS BLD VENIPUNCTURE: CPT

## 2024-05-14 PROCEDURE — 80048 BASIC METABOLIC PNL TOTAL CA: CPT

## 2024-05-21 ENCOUNTER — OFFICE VISIT (OUTPATIENT)
Dept: NEPHROLOGY | Facility: CLINIC | Age: 74
End: 2024-05-21

## 2024-05-21 VITALS — WEIGHT: 147.13 LBS | DIASTOLIC BLOOD PRESSURE: 70 MMHG | SYSTOLIC BLOOD PRESSURE: 112 MMHG | BODY MASS INDEX: 24 KG/M2

## 2024-05-21 DIAGNOSIS — N18.4 CKD (CHRONIC KIDNEY DISEASE) STAGE 4, GFR 15-29 ML/MIN (HCC): Primary | ICD-10-CM

## 2024-05-21 DIAGNOSIS — D63.1 ANEMIA DUE TO STAGE 4 CHRONIC KIDNEY DISEASE (HCC): ICD-10-CM

## 2024-05-21 DIAGNOSIS — N18.4 ANEMIA DUE TO STAGE 4 CHRONIC KIDNEY DISEASE (HCC): ICD-10-CM

## 2024-05-21 DIAGNOSIS — I15.9 SECONDARY HYPERTENSION: ICD-10-CM

## 2024-05-21 DIAGNOSIS — R80.9 PROTEINURIA, UNSPECIFIED TYPE: ICD-10-CM

## 2024-05-21 PROCEDURE — 99215 OFFICE O/P EST HI 40 MIN: CPT | Performed by: INTERNAL MEDICINE

## 2024-05-21 NOTE — PROGRESS NOTES
Nephrology Progress Note      ASSESSMENT/PLAN:      1) CKD 4- longstanding CKD with initial Cr 2.0 mg/dl 2011 accompanied by modest subnephrotic range proteinuria most consistent with either prior insult such as postinfectious glomerulonephritis or a low-grade glomerulopathy such as membranous glomerulonephritis or FSGS- and predates onset of HTN / DM 2. There is no evidence of a nephritic process given his consistently bland urine sediment.  Previous evaluation with serologies and monoclonal protein study was unrevealing.  He also underwent bone marrow biopsy by Dr. Marks in 2020 which did not reveal a plasma cell dyscrasia. Meds are benign without chronic analgesic or PPI use.  There is no history of obstructive uropathy. PLAN- d/w pt and wife at length. Progressive renal dysfunction due to age-related nephrosclerosis, HTN +/- DM. Will refer for transplant eval given eGFR < 20 ml/min; to check labs q2 months and f/u in 6 months.    2) DM 2- well controlled on actos with A1C < approx 6- CPM    3) HTN- BP excellent in 110/60 range on amlodipine + valsartan 320/25 daily     4) Anemia- due to CKD; check hgb q2 months. EPO for hgb < 10 g/dl        HPI:   Miguel Rees is a 73 year old male who presents for follow-up of   Chief Complaint   Patient presents with    Chronic Kidney Disease    Acute Renal Injury    Hypertension       Very pleasant 73-year-old male presents for follow-up of chronic kidney disease; send serum creatinine higher to 3.5 mg/dL range.  No changes medications was switching from losartan to valsartan plus hydrochlorothiazide about 3 months ago for BP management.  Denies any recent NSAID use.  No obstructive symptoms.  No other changes in medications.  Otherwise feels fine.    HISTORY:  Past Medical History:    Anemia    Diabetes mellitus (HCC)    Disorder of prostate    enlarged prostate    Glaucoma    minor    Hearing loss    High cholesterol    Type II or unspecified type diabetes mellitus without  mention of complication, not stated as uncontrolled    Unspecified essential hypertension    Wears glasses      Past Surgical History:   Procedure Laterality Date    Cholecystectomy  11/2001    Colonoscopy  08/24/2019    Removal gallbladder        Family History   Problem Relation Age of Onset    Diabetes Father       Social History:   Social History     Socioeconomic History    Marital status:    Tobacco Use    Smoking status: Never    Smokeless tobacco: Never   Vaping Use    Vaping status: Never Used   Substance and Sexual Activity    Alcohol use: No    Drug use: No        Medications (Active prior to today's visit):  Current Outpatient Medications   Medication Sig Dispense Refill    amLODIPine 10 MG Oral Tab Take 1 tablet (10 mg total) by mouth daily. 90 tablet 1    prednisoLONE 1 % Ophthalmic Suspension INSTILL 1 DROP IN SURGERY EYE 4 TIMES DAILY AFTER SURGERY FOR 1 WEEK, THEN 1 DROP IN SURGERY EYE THREE TIMES DAILY FOR 1 WEEK, THEN 1 DROP IN SURGERY EYE 2 TIMES DAILY FOR 1 WEEK ,THEN 1 DROP IN SURGERY EYE ONCE DAILY FOR 1 MORE WEEK.      Valsartan-hydroCHLOROthiazide 320-25 MG Oral Tab Take 1 tablet by mouth daily. 30 tablet 11    Pioglitazone HCl 15 MG Oral Tab Take 1 tablet (15 mg total) by mouth daily.      Fluticasone Propionate 50 MCG/ACT Nasal Suspension by Each Nare route daily.      Cholecalciferol (VITAMIN D) 1000 units Oral Tab Take by mouth. 2000mg       Vitamin B-12 1000 MCG Oral Tab Take 1 tablet (1,000 mcg total) by mouth daily.      aspirin 81 MG Oral Tab Take 1 tablet (81 mg total) by mouth daily.      fenofibrate micronized (LOFIBRA) 134 MG Oral Cap Take 1 capsule (134 mg total) by mouth daily with breakfast.         Allergies:  Allergies   Allergen Reactions    Seasonal OTHER (SEE COMMENTS)     Sneezing       ROS:     Denies fever/chills  Denies wt loss/gain  Denies HA or visual changes  Denies CP or palpitations  Denies SOB/cough/hemoptysis  Denies abd or flank pain  Denies  N/V/D  Denies change in urinary habits or gross hematuria  Denies LE edema  Denies skin rashes/myalgias/arthralgias      PHYSICAL EXAM:   /70   Wt 147 lb 2 oz (66.7 kg)   BMI 23.75 kg/m²   Wt Readings from Last 3 Encounters:   05/21/24 147 lb 2 oz (66.7 kg)   01/17/24 152 lb 6 oz (69.1 kg)   12/29/23 159 lb 9.6 oz (72.4 kg)     General: Alert and oriented in no apparent distress.  HEENT: No scleral icterus, MMM  Neck: Supple, no DEANNE or thyromegaly  Cardiac: Regular rate and rhythm, S1, S2 normal, no murmur or rub  Lungs: Clear without wheezes, rales, rhonchi.    Abdomen: Soft, non-tender. + bowel sounds, no palpable organomegaly  Extremities: Without clubbing, cyanosis or edema.  Neurologic: Alert and oriented, normal affect, cranial nerves grossly intact, moving all extremities  Skin: Warm and dry, no rashes      Maile Vincent MD  1/17/2024  236 PM

## 2024-06-10 ENCOUNTER — NURSE ONLY (OUTPATIENT)
Dept: NEPHROLOGY | Facility: CLINIC | Age: 74
End: 2024-06-10
Payer: MEDICARE

## 2024-06-10 VITALS — DIASTOLIC BLOOD PRESSURE: 78 MMHG | WEIGHT: 147.13 LBS | BODY MASS INDEX: 24 KG/M2 | SYSTOLIC BLOOD PRESSURE: 130 MMHG

## 2024-06-10 DIAGNOSIS — D63.1 ANEMIA DUE TO STAGE 4 CHRONIC KIDNEY DISEASE (HCC): Primary | ICD-10-CM

## 2024-06-10 DIAGNOSIS — N18.4 ANEMIA DUE TO STAGE 4 CHRONIC KIDNEY DISEASE (HCC): Primary | ICD-10-CM

## 2024-06-24 ENCOUNTER — TELEPHONE (OUTPATIENT)
Dept: HEMATOLOGY/ONCOLOGY | Facility: HOSPITAL | Age: 74
End: 2024-06-24

## 2024-06-24 NOTE — TELEPHONE ENCOUNTER
Pt's wife advised she sent a message via [employer] on 5/30 regarding test results and depending on the results does the pt need to schedule an appt and does he need to start taking iron supplements. Please call back

## 2024-07-02 ENCOUNTER — OFFICE VISIT (OUTPATIENT)
Dept: HEMATOLOGY/ONCOLOGY | Facility: HOSPITAL | Age: 74
End: 2024-07-02
Attending: INTERNAL MEDICINE
Payer: MEDICARE

## 2024-07-02 VITALS
BODY MASS INDEX: 22.82 KG/M2 | HEIGHT: 65.98 IN | SYSTOLIC BLOOD PRESSURE: 133 MMHG | DIASTOLIC BLOOD PRESSURE: 77 MMHG | TEMPERATURE: 98 F | WEIGHT: 142 LBS | OXYGEN SATURATION: 100 % | HEART RATE: 75 BPM | RESPIRATION RATE: 16 BRPM

## 2024-07-02 DIAGNOSIS — N18.4 ANEMIA DUE TO STAGE 4 CHRONIC KIDNEY DISEASE (HCC): Primary | ICD-10-CM

## 2024-07-02 DIAGNOSIS — D63.1 ANEMIA DUE TO STAGE 4 CHRONIC KIDNEY DISEASE (HCC): Primary | ICD-10-CM

## 2024-07-02 DIAGNOSIS — D50.8 IRON DEFICIENCY ANEMIA SECONDARY TO INADEQUATE DIETARY IRON INTAKE: ICD-10-CM

## 2024-07-02 PROCEDURE — G2211 COMPLEX E/M VISIT ADD ON: HCPCS | Performed by: INTERNAL MEDICINE

## 2024-07-02 PROCEDURE — 99215 OFFICE O/P EST HI 40 MIN: CPT | Performed by: INTERNAL MEDICINE

## 2024-07-02 NOTE — PROGRESS NOTES
Hematology/Oncology Clinic Follow Up Visit    Patient Name: Miguel Rees  Medical Record Number: [id number]    YOB: 1950   PCP: EMILY NOLASCO    Reason for Consultation:  Miguel Rees was seen today for the diagnosis of anemia of CKD stage 4    History of Present Illness:      73 y/o PMH CKD stage 4 presenting for follow up of anemia.    - got IV injectafer 750mg x2, last dose completed 1/5/24  - had improvement with hgb to 10.4g/dL on 4/2024 with adequate iron stores  - recent labs drawn by PCP show 9.3 g/dL on 5/28/24. Iron sat 35% and ferritin of 375  - started on aranesp 300mcg by Dr Vincent; last given 6/10; with instructions to draw labs next week with pt and wife are aware of  - they are having a consult with [facility] nephrology for kidney transplant consideration  - PSA elevated to 5.2; seeing Dr Phillips on 7/11  - reports symptomatic fatigue improvement after IV injectafer, but now feeling fatigued again    Past Medical History:  Past Medical History:    Anemia    Diabetes mellitus (HCC)    Disorder of prostate    enlarged prostate    Glaucoma    minor    Hearing loss    High cholesterol    Type II or unspecified type diabetes mellitus without mention of complication, not stated as uncontrolled    Unspecified essential hypertension    Wears glasses     Past Surgical History:   Procedure Laterality Date    Cholecystectomy  11/2001    Colonoscopy  08/24/2019    Removal gallbladder         Home Medications:   Pioglitazone HCl 15 MG Oral Tab Take 1 tablet (15 mg total) by mouth daily.      Fluticasone Propionate 50 MCG/ACT Nasal Suspension by Each Nare route daily.      Cholecalciferol (VITAMIN D) 1000 units Oral Tab Take by mouth. 2000mg       Vitamin B-12 1000 MCG Oral Tab Take 1 tablet (1,000 mcg total) by mouth daily.      aspirin 81 MG Oral Tab Take 1 tablet (81 mg total) by mouth daily.      fenofibrate micronized (LOFIBRA) 134 MG Oral Cap Take 1 capsule (134 mg total) by mouth daily with  breakfast.        [COMPLETED] darbepoetin kimberly (Aranesp) 300 MCG/0.6ML injection 300 mcg  300 mcg Subcutaneous Once Maile Vincent MD   300 mcg at 06/10/24 1045       Allergies:   Allergies   Allergen Reactions    Seasonal OTHER (SEE COMMENTS)     Sneezing       Psychosocial History:  Social History     Socioeconomic History    Marital status:      Spouse name: Not on file    Number of children: Not on file    Years of education: Not on file    Highest education level: Not on file   Occupational History    Not on file   Tobacco Use    Smoking status: Never    Smokeless tobacco: Never   Vaping Use    Vaping status: Never Used   Substance and Sexual Activity    Alcohol use: No    Drug use: No    Sexual activity: Not on file   Other Topics Concern    Not on file   Social History Narrative    Not on file     Social Determinants of Health     Financial Resource Strain: Not on file   Food Insecurity: Not on file   Transportation Needs: Not on file   Physical Activity: Not on file   Stress: Not on file   Social Connections: Not on file   Housing Stability: Low Risk  (5/31/2024)    Received [facility]    Housing Stability     Mortgage Payment Concerns?: Not on file     Number of Places Lived in the Last Year: Not on file     Unstable Housing?: Not on file       Family Medical History:  Family History   Problem Relation Age of Onset    Diabetes Father        Review of Systems:  A 10-point ROS was done with pertinent positives and negative per the HPI    Vital Signs:  Height: 167.6 cm (5' 5.98\") (07/02 1108)  Weight: 64.4 kg (142 lb) (07/02 1108)  BSA (Calculated - sq m): 1.73 sq meters (07/02 1108)  Pulse: 75 (07/02 1130)  BP: 133/77 (07/02 1130)  Temp: 97.8 °F (36.6 °C) (07/02 1108)  Do Not Use - Resp Rate: --  SpO2: 100 % (07/02 1108)    Wt Readings from Last 6 Encounters:   07/02/24 64.4 kg (142 lb)   06/10/24 66.7 kg (147 lb 2 oz)   05/21/24 66.7 kg (147 lb 2 oz)   01/17/24 69.1 kg (152  lb 6 oz)   12/29/23 72.4 kg (159 lb 9.6 oz)   09/20/23 72.1 kg (159 lb)         Physical Examination:  General: Patient is alert and oriented, not in acute distress  Psych: Mood and affect are appropriate  Eyes: EOMI, PERRL  ENT: Oropharynx is clear, no adenopathy  CV: no LE edema  Respiratory: Non-labored respirations  GI/Abd: Soft, non-tender   Neurological: Grossly intact   Skin: no rashes or petechiae    Laboratory:  Lab Results   Component Value Date    WBC 4.7 04/05/2024    WBC 12.8 02/20/2011    HGB 10.4 (L) 04/05/2024    HGB 13.5 02/20/2011    HCT 32.8 (L) 04/05/2024    MCV 97.9 04/05/2024    MCH 31.0 04/05/2024    MCHC 31.7 04/05/2024    RDW 13.4 04/05/2024    .0 04/05/2024    .0 02/20/2011     Lab Results   Component Value Date     (H) 05/14/2024    BUN 86 (H) 05/14/2024    CREATSERUM 3.56 (H) 05/14/2024    CREATSERUM 3.85 (H) 04/05/2024    CREATSERUM 3.23 (H) 02/29/2024    ANIONGAP 7 05/14/2024    GFRNAA 37 (L) 02/20/2011    GFRAA 44 (L) 02/20/2011    CA 8.7 05/14/2024    OSMOCALC 313 (H) 05/14/2024     05/14/2024    K 4.7 05/14/2024     05/14/2024    CO2 21.0 05/14/2024     No results found for: \"PTT\", \"PT\", \"INR\"    Impression & Plan:     Anemia of CKD stage 4  - agree with EPO injections initiation given hgb <10, discussed with pt and wife target hgb 10-11g/dL  - EPO management per Dr Vincent  - check vit B12 and folic acid with next set of labs  - iron stores adequate. check iron stores in 2 months; discussed regular iron/tibc/ferritin monitoring while receiving EPO    CKD stage 4  - seeing [facility] for transplant consideration    Elevated PSA  - has appt with urology    Follow up: to return for more IV iron if he is iron deficient in future.    Eder Gibson  Hematology/Medical Oncology  [facility]

## 2024-07-02 NOTE — PROGRESS NOTES
Education Record    Learner:  Patient and Spouse    Disease / Diagnosis: Anemia    Barriers / Limitations:  None   Comments:    Method:  Discussion   Comments:    General Topics:  Medication, Side effects and symptom management, and Plan of care reviewed   Comments:    Outcome:  Shows understanding   Comments:    Energy mediocre. No dizziness or SOB. No bleeding. Last aranesp inj 6/10. Bps have been low at home.

## 2024-07-11 ENCOUNTER — LAB ENCOUNTER (OUTPATIENT)
Dept: LAB | Age: 74
End: 2024-07-11
Attending: INTERNAL MEDICINE
Payer: MEDICARE

## 2024-07-11 DIAGNOSIS — E87.5 HYPERKALEMIA: ICD-10-CM

## 2024-07-11 DIAGNOSIS — I15.9 SECONDARY HYPERTENSION: ICD-10-CM

## 2024-07-11 DIAGNOSIS — R80.9 PROTEINURIA, UNSPECIFIED TYPE: ICD-10-CM

## 2024-07-11 DIAGNOSIS — D63.1 ANEMIA DUE TO STAGE 4 CHRONIC KIDNEY DISEASE (HCC): ICD-10-CM

## 2024-07-11 DIAGNOSIS — N18.4 CKD (CHRONIC KIDNEY DISEASE) STAGE 4, GFR 15-29 ML/MIN (HCC): ICD-10-CM

## 2024-07-11 DIAGNOSIS — N18.4 ANEMIA DUE TO STAGE 4 CHRONIC KIDNEY DISEASE (HCC): ICD-10-CM

## 2024-07-11 LAB
ANION GAP SERPL CALC-SCNC: 6 MMOL/L (ref 0–18)
BASOPHILS # BLD AUTO: 0.05 X10(3) UL (ref 0–0.2)
BASOPHILS NFR BLD AUTO: 1 %
BUN BLD-MCNC: 42 MG/DL (ref 9–23)
CALCIUM BLD-MCNC: 9.2 MG/DL (ref 8.5–10.1)
CHLORIDE SERPL-SCNC: 113 MMOL/L (ref 98–112)
CO2 SERPL-SCNC: 22 MMOL/L (ref 21–32)
CREAT BLD-MCNC: 2.53 MG/DL
EGFRCR SERPLBLD CKD-EPI 2021: 26 ML/MIN/1.73M2 (ref 60–?)
EOSINOPHIL # BLD AUTO: 0.1 X10(3) UL (ref 0–0.7)
EOSINOPHIL NFR BLD AUTO: 2 %
ERYTHROCYTE [DISTWIDTH] IN BLOOD BY AUTOMATED COUNT: 14.9 %
FASTING STATUS PATIENT QL REPORTED: YES
FOLATE SERPL-MCNC: 16.3 NG/ML (ref 8.7–?)
GLUCOSE BLD-MCNC: 83 MG/DL (ref 70–99)
HCT VFR BLD AUTO: 34.4 %
HGB BLD-MCNC: 10.7 G/DL
IMM GRANULOCYTES # BLD AUTO: 0.02 X10(3) UL (ref 0–1)
IMM GRANULOCYTES NFR BLD: 0.4 %
LYMPHOCYTES # BLD AUTO: 0.8 X10(3) UL (ref 1–4)
LYMPHOCYTES NFR BLD AUTO: 16 %
MCH RBC QN AUTO: 31.8 PG (ref 26–34)
MCHC RBC AUTO-ENTMCNC: 31.1 G/DL (ref 31–37)
MCV RBC AUTO: 102.4 FL
MONOCYTES # BLD AUTO: 0.31 X10(3) UL (ref 0.1–1)
MONOCYTES NFR BLD AUTO: 6.2 %
NEUTROPHILS # BLD AUTO: 3.73 X10 (3) UL (ref 1.5–7.7)
NEUTROPHILS # BLD AUTO: 3.73 X10(3) UL (ref 1.5–7.7)
NEUTROPHILS NFR BLD AUTO: 74.4 %
OSMOLALITY SERPL CALC.SUM OF ELEC: 302 MOSM/KG (ref 275–295)
PHOSPHATE SERPL-MCNC: 2.5 MG/DL (ref 2.5–4.9)
PLATELET # BLD AUTO: 352 10(3)UL (ref 150–450)
POTASSIUM SERPL-SCNC: 5 MMOL/L (ref 3.5–5.1)
RBC # BLD AUTO: 3.36 X10(6)UL
RH BLOOD TYPE: POSITIVE
SODIUM SERPL-SCNC: 141 MMOL/L (ref 136–145)
VIT B12 SERPL-MCNC: 1907 PG/ML (ref 193–986)
WBC # BLD AUTO: 5 X10(3) UL (ref 4–11)

## 2024-07-11 PROCEDURE — 85025 COMPLETE CBC W/AUTO DIFF WBC: CPT

## 2024-07-11 PROCEDURE — 36415 COLL VENOUS BLD VENIPUNCTURE: CPT

## 2024-07-11 PROCEDURE — 84100 ASSAY OF PHOSPHORUS: CPT

## 2024-07-11 PROCEDURE — 86900 BLOOD TYPING SEROLOGIC ABO: CPT

## 2024-07-11 PROCEDURE — 86901 BLOOD TYPING SEROLOGIC RH(D): CPT

## 2024-07-11 PROCEDURE — 80048 BASIC METABOLIC PNL TOTAL CA: CPT

## 2024-07-11 PROCEDURE — 82746 ASSAY OF FOLIC ACID SERUM: CPT

## 2024-07-11 PROCEDURE — 82607 VITAMIN B-12: CPT

## 2024-08-29 ENCOUNTER — LAB ENCOUNTER (OUTPATIENT)
Dept: LAB | Age: 74
End: 2024-08-29
Attending: INTERNAL MEDICINE
Payer: MEDICARE

## 2024-08-29 DIAGNOSIS — N18.4 ANEMIA DUE TO STAGE 4 CHRONIC KIDNEY DISEASE (HCC): ICD-10-CM

## 2024-08-29 DIAGNOSIS — D63.1 ANEMIA DUE TO STAGE 4 CHRONIC KIDNEY DISEASE (HCC): ICD-10-CM

## 2024-08-29 LAB
DEPRECATED HBV CORE AB SER IA-ACNC: 445.3 NG/ML
IRON SATN MFR SERPL: 29 %
IRON SERPL-MCNC: 97 UG/DL
TOTAL IRON BINDING CAPACITY: 340 UG/DL (ref 250–425)
TRANSFERRIN SERPL-MCNC: 257 MG/DL (ref 215–365)

## 2024-08-29 PROCEDURE — 83550 IRON BINDING TEST: CPT

## 2024-08-29 PROCEDURE — 36415 COLL VENOUS BLD VENIPUNCTURE: CPT

## 2024-08-29 PROCEDURE — 83540 ASSAY OF IRON: CPT

## 2024-08-29 PROCEDURE — 82728 ASSAY OF FERRITIN: CPT

## 2024-09-16 ENCOUNTER — PATIENT MESSAGE (OUTPATIENT)
Dept: NEPHROLOGY | Facility: CLINIC | Age: 74
End: 2024-09-16

## 2024-09-16 ENCOUNTER — LAB ENCOUNTER (OUTPATIENT)
Dept: LAB | Age: 74
End: 2024-09-16
Attending: INTERNAL MEDICINE
Payer: MEDICARE

## 2024-09-16 DIAGNOSIS — N18.4 ANEMIA DUE TO STAGE 4 CHRONIC KIDNEY DISEASE (HCC): ICD-10-CM

## 2024-09-16 DIAGNOSIS — D63.1 ANEMIA DUE TO STAGE 4 CHRONIC KIDNEY DISEASE (HCC): ICD-10-CM

## 2024-09-16 DIAGNOSIS — N18.4 ANEMIA DUE TO STAGE 4 CHRONIC KIDNEY DISEASE (HCC): Primary | ICD-10-CM

## 2024-09-16 DIAGNOSIS — E87.5 HYPERKALEMIA: ICD-10-CM

## 2024-09-16 DIAGNOSIS — R80.9 PROTEINURIA, UNSPECIFIED TYPE: ICD-10-CM

## 2024-09-16 DIAGNOSIS — D63.1 ANEMIA DUE TO STAGE 4 CHRONIC KIDNEY DISEASE (HCC): Primary | ICD-10-CM

## 2024-09-16 DIAGNOSIS — I15.9 SECONDARY HYPERTENSION: ICD-10-CM

## 2024-09-16 DIAGNOSIS — N18.4 CKD (CHRONIC KIDNEY DISEASE) STAGE 4, GFR 15-29 ML/MIN (HCC): ICD-10-CM

## 2024-09-16 LAB
ANION GAP SERPL CALC-SCNC: 6 MMOL/L (ref 0–18)
BASOPHILS # BLD AUTO: 0.05 X10(3) UL (ref 0–0.2)
BASOPHILS NFR BLD AUTO: 1 %
BUN BLD-MCNC: 37 MG/DL (ref 9–23)
CALCIUM BLD-MCNC: 9.7 MG/DL (ref 8.7–10.4)
CHLORIDE SERPL-SCNC: 108 MMOL/L (ref 98–112)
CO2 SERPL-SCNC: 25 MMOL/L (ref 21–32)
CREAT BLD-MCNC: 2.28 MG/DL
EGFRCR SERPLBLD CKD-EPI 2021: 30 ML/MIN/1.73M2 (ref 60–?)
EOSINOPHIL # BLD AUTO: 0.11 X10(3) UL (ref 0–0.7)
EOSINOPHIL NFR BLD AUTO: 2.2 %
ERYTHROCYTE [DISTWIDTH] IN BLOOD BY AUTOMATED COUNT: 14.9 %
FASTING STATUS PATIENT QL REPORTED: NO
GLUCOSE BLD-MCNC: 96 MG/DL (ref 70–99)
HCT VFR BLD AUTO: 28.9 %
HGB BLD-MCNC: 9.3 G/DL
IMM GRANULOCYTES # BLD AUTO: 0.01 X10(3) UL (ref 0–1)
IMM GRANULOCYTES NFR BLD: 0.2 %
LYMPHOCYTES # BLD AUTO: 0.73 X10(3) UL (ref 1–4)
LYMPHOCYTES NFR BLD AUTO: 14.5 %
MCH RBC QN AUTO: 31 PG (ref 26–34)
MCHC RBC AUTO-ENTMCNC: 32.2 G/DL (ref 31–37)
MCV RBC AUTO: 96.3 FL
MONOCYTES # BLD AUTO: 0.36 X10(3) UL (ref 0.1–1)
MONOCYTES NFR BLD AUTO: 7.2 %
NEUTROPHILS # BLD AUTO: 3.76 X10 (3) UL (ref 1.5–7.7)
NEUTROPHILS # BLD AUTO: 3.76 X10(3) UL (ref 1.5–7.7)
NEUTROPHILS NFR BLD AUTO: 74.9 %
OSMOLALITY SERPL CALC.SUM OF ELEC: 297 MOSM/KG (ref 275–295)
PHOSPHATE SERPL-MCNC: 2.7 MG/DL (ref 2.4–5.1)
PLATELET # BLD AUTO: 385 10(3)UL (ref 150–450)
POTASSIUM SERPL-SCNC: 5.2 MMOL/L (ref 3.5–5.1)
RBC # BLD AUTO: 3 X10(6)UL
SODIUM SERPL-SCNC: 139 MMOL/L (ref 136–145)
WBC # BLD AUTO: 5 X10(3) UL (ref 4–11)

## 2024-09-16 PROCEDURE — 84100 ASSAY OF PHOSPHORUS: CPT

## 2024-09-16 PROCEDURE — 36415 COLL VENOUS BLD VENIPUNCTURE: CPT

## 2024-09-16 PROCEDURE — 85025 COMPLETE CBC W/AUTO DIFF WBC: CPT

## 2024-09-16 PROCEDURE — 80048 BASIC METABOLIC PNL TOTAL CA: CPT

## 2024-09-18 NOTE — TELEPHONE ENCOUNTER
From: Maile Vincent  To: Miguel Rees  Sent: 9/16/2024 8:16 PM CDT  Subject: Labs    Mr. Rees-    Your labs look pretty good- creatinine is down and potassium is acceptable. Dr. Gibson is managing your anemia-    PARRISH Vincent

## 2024-10-07 RX ORDER — AMLODIPINE BESYLATE 10 MG/1
10 TABLET ORAL DAILY
Qty: 90 TABLET | Refills: 1 | Status: SHIPPED | OUTPATIENT
Start: 2024-10-07

## 2024-10-18 ENCOUNTER — LAB ENCOUNTER (OUTPATIENT)
Dept: LAB | Age: 74
End: 2024-10-18
Attending: INTERNAL MEDICINE
Payer: MEDICARE

## 2024-10-18 DIAGNOSIS — D63.1 ANEMIA DUE TO STAGE 4 CHRONIC KIDNEY DISEASE (HCC): ICD-10-CM

## 2024-10-18 DIAGNOSIS — I15.9 SECONDARY HYPERTENSION: ICD-10-CM

## 2024-10-18 DIAGNOSIS — N18.4 CKD (CHRONIC KIDNEY DISEASE) STAGE 4, GFR 15-29 ML/MIN (HCC): ICD-10-CM

## 2024-10-18 DIAGNOSIS — N18.4 ANEMIA DUE TO STAGE 4 CHRONIC KIDNEY DISEASE (HCC): ICD-10-CM

## 2024-10-18 DIAGNOSIS — R80.9 PROTEINURIA, UNSPECIFIED TYPE: ICD-10-CM

## 2024-10-18 LAB
ANION GAP SERPL CALC-SCNC: 7 MMOL/L (ref 0–18)
BASOPHILS # BLD AUTO: 0.04 X10(3) UL (ref 0–0.2)
BASOPHILS NFR BLD AUTO: 0.7 %
BUN BLD-MCNC: 45 MG/DL (ref 9–23)
CALCIUM BLD-MCNC: 9.8 MG/DL (ref 8.7–10.4)
CHLORIDE SERPL-SCNC: 109 MMOL/L (ref 98–112)
CO2 SERPL-SCNC: 25 MMOL/L (ref 21–32)
CREAT BLD-MCNC: 2.44 MG/DL
EGFRCR SERPLBLD CKD-EPI 2021: 27 ML/MIN/1.73M2 (ref 60–?)
EOSINOPHIL # BLD AUTO: 0.13 X10(3) UL (ref 0–0.7)
EOSINOPHIL NFR BLD AUTO: 2.2 %
ERYTHROCYTE [DISTWIDTH] IN BLOOD BY AUTOMATED COUNT: 15.3 %
FASTING STATUS PATIENT QL REPORTED: NO
GLUCOSE BLD-MCNC: 78 MG/DL (ref 70–99)
HCT VFR BLD AUTO: 29.6 %
HGB BLD-MCNC: 9.6 G/DL
IMM GRANULOCYTES # BLD AUTO: 0.02 X10(3) UL (ref 0–1)
IMM GRANULOCYTES NFR BLD: 0.3 %
LYMPHOCYTES # BLD AUTO: 0.87 X10(3) UL (ref 1–4)
LYMPHOCYTES NFR BLD AUTO: 14.7 %
MCH RBC QN AUTO: 31.5 PG (ref 26–34)
MCHC RBC AUTO-ENTMCNC: 32.4 G/DL (ref 31–37)
MCV RBC AUTO: 97 FL
MONOCYTES # BLD AUTO: 0.44 X10(3) UL (ref 0.1–1)
MONOCYTES NFR BLD AUTO: 7.4 %
NEUTROPHILS # BLD AUTO: 4.42 X10 (3) UL (ref 1.5–7.7)
NEUTROPHILS # BLD AUTO: 4.42 X10(3) UL (ref 1.5–7.7)
NEUTROPHILS NFR BLD AUTO: 74.7 %
OSMOLALITY SERPL CALC.SUM OF ELEC: 302 MOSM/KG (ref 275–295)
PHOSPHATE SERPL-MCNC: 3.3 MG/DL (ref 2.4–5.1)
PLATELET # BLD AUTO: 375 10(3)UL (ref 150–450)
POTASSIUM SERPL-SCNC: 4.4 MMOL/L (ref 3.5–5.1)
RBC # BLD AUTO: 3.05 X10(6)UL
SODIUM SERPL-SCNC: 141 MMOL/L (ref 136–145)
WBC # BLD AUTO: 5.9 X10(3) UL (ref 4–11)

## 2024-10-18 PROCEDURE — 85025 COMPLETE CBC W/AUTO DIFF WBC: CPT

## 2024-10-18 PROCEDURE — 36415 COLL VENOUS BLD VENIPUNCTURE: CPT

## 2024-10-18 PROCEDURE — 80048 BASIC METABOLIC PNL TOTAL CA: CPT

## 2024-10-18 PROCEDURE — 84100 ASSAY OF PHOSPHORUS: CPT

## 2024-11-21 ENCOUNTER — OFFICE VISIT (OUTPATIENT)
Dept: NEPHROLOGY | Facility: CLINIC | Age: 74
End: 2024-11-21
Payer: MEDICARE

## 2024-11-21 VITALS — BODY MASS INDEX: 24 KG/M2 | WEIGHT: 148.25 LBS | SYSTOLIC BLOOD PRESSURE: 134 MMHG | DIASTOLIC BLOOD PRESSURE: 78 MMHG

## 2024-11-21 DIAGNOSIS — N18.4 CKD (CHRONIC KIDNEY DISEASE) STAGE 4, GFR 15-29 ML/MIN (HCC): Primary | ICD-10-CM

## 2024-11-21 DIAGNOSIS — I10 PRIMARY HYPERTENSION: ICD-10-CM

## 2024-11-21 DIAGNOSIS — E11.21 DIABETIC NEPHROPATHY ASSOCIATED WITH TYPE 2 DIABETES MELLITUS (HCC): ICD-10-CM

## 2024-11-21 RX ORDER — ATORVASTATIN CALCIUM 20 MG/1
20 TABLET, FILM COATED ORAL NIGHTLY
COMMUNITY

## 2024-11-21 NOTE — PROGRESS NOTES
Nephrology Progress Note      ASSESSMENT/PLAN:      1) CKD 4- longstanding CKD with initial Cr 2.0 mg/dl 2011 accompanied by modest subnephrotic range proteinuria most consistent with either prior insult such as postinfectious glomerulonephritis or a low-grade glomerulopathy such as membranous glomerulonephritis or FSGS- and predates onset of HTN / DM 2. There is no evidence of a nephritic process given his consistently bland urine sediment.  Previous evaluation with serologies and monoclonal protein study was unrevealing.  He also underwent bone marrow biopsy by Dr. Marks in 2020 which did not reveal a plasma cell dyscrasia. Meds are benign without chronic analgesic or PPI use.  There is no history of obstructive uropathy. PLAN- d/w pt and wife at length. Progressive renal dysfunction due to age-related nephrosclerosis, HTN +/- DM. To complete transplant eval at [facility]; recheck labs monthly and f/u in 3 months    2) DM 2- well controlled on actos with A1C < approx 6- CPM    3) HTN- BP excellent in 110/60 range on amlodipine; off valsartan + thiazide with recently lower BPs    4) Anemia- due to CKD; check hgb q2 months. EPO for hgb < 9 g/dl     5) CV- cardiac cath pending (transplant eval)       HPI:   Miguel Rees is a 74 year old male who presents for follow-up of   Chief Complaint   Patient presents with    Chronic Kidney Disease    Hypertension       Very pleasant 74-year-old male presents for follow-up of chronic kidney disease;     HISTORY:  Past Medical History:    Anemia    Diabetes mellitus (HCC)    Disorder of prostate    enlarged prostate    Glaucoma    minor    Hearing loss    High cholesterol    Type II or unspecified type diabetes mellitus without mention of complication, not stated as uncontrolled    Unspecified essential hypertension    Wears glasses      Past Surgical History:   Procedure Laterality Date    Cholecystectomy  11/2001    Colonoscopy  08/24/2019    Removal gallbladder        Family History    Problem Relation Age of Onset    Diabetes Father       Social History:   Social History     Socioeconomic History    Marital status:    Tobacco Use    Smoking status: Never    Smokeless tobacco: Never   Vaping Use    Vaping status: Never Used   Substance and Sexual Activity    Alcohol use: No    Drug use: No     Social Drivers of Health      Received from [facility]    Housing Stability        Medications (Active prior to today's visit):  Current Outpatient Medications   Medication Sig Dispense Refill    atorvastatin 20 MG Oral Tab Take 1 tablet (20 mg total) by mouth nightly.      amLODIPine 10 MG Oral Tab Take 1 tablet (10 mg total) by mouth daily. 90 tablet 1    Pioglitazone HCl 15 MG Oral Tab Take 1 tablet (15 mg total) by mouth daily.      Fluticasone Propionate 50 MCG/ACT Nasal Suspension by Each Nare route daily.      Cholecalciferol (VITAMIN D) 1000 units Oral Tab Take by mouth. 2000mg       Vitamin B-12 1000 MCG Oral Tab Take 1 tablet (1,000 mcg total) by mouth daily.      aspirin 81 MG Oral Tab Take 1 tablet (81 mg total) by mouth daily.         Allergies:  Allergies   Allergen Reactions    Seasonal OTHER (SEE COMMENTS)     Sneezing       ROS:     Denies fever/chills  Denies wt loss/gain  Denies HA or visual changes  Denies CP or palpitations  Denies SOB/cough/hemoptysis  Denies abd or flank pain  Denies N/V/D  Denies change in urinary habits or gross hematuria  Denies LE edema  Denies skin rashes/myalgias/arthralgias      PHYSICAL EXAM:   /78   Wt 148 lb 4 oz (67.2 kg)   BMI 23.94 kg/m²   Wt Readings from Last 3 Encounters:   11/21/24 148 lb 4 oz (67.2 kg)   07/02/24 142 lb (64.4 kg)   06/10/24 147 lb 2 oz (66.7 kg)     General: Alert and oriented in no apparent distress.  HEENT: No scleral icterus, MMM  Neck: Supple, no DEANNE or thyromegaly  Cardiac: Regular rate and rhythm, S1, S2 normal, no murmur or rub  Lungs: Clear without wheezes, rales, rhonchi.    Abdomen: Soft,  non-tender. + bowel sounds, no palpable organomegaly  Extremities: Without clubbing, cyanosis or edema.  Neurologic: Alert and oriented, normal affect, cranial nerves grossly intact, moving all extremities  Skin: Warm and dry, no rashes      Maile Vincent MD  11/21/2024  131 PM

## 2025-03-12 ENCOUNTER — LAB ENCOUNTER (OUTPATIENT)
Dept: LAB | Age: 75
End: 2025-03-12
Attending: INTERNAL MEDICINE
Payer: MEDICARE

## 2025-03-12 DIAGNOSIS — D63.1 ANEMIA DUE TO STAGE 4 CHRONIC KIDNEY DISEASE (HCC): ICD-10-CM

## 2025-03-12 DIAGNOSIS — N18.4 CKD (CHRONIC KIDNEY DISEASE) STAGE 4, GFR 15-29 ML/MIN (HCC): ICD-10-CM

## 2025-03-12 DIAGNOSIS — R80.9 PROTEINURIA, UNSPECIFIED TYPE: ICD-10-CM

## 2025-03-12 DIAGNOSIS — N18.4 ANEMIA DUE TO STAGE 4 CHRONIC KIDNEY DISEASE (HCC): ICD-10-CM

## 2025-03-12 DIAGNOSIS — I15.9 SECONDARY HYPERTENSION: ICD-10-CM

## 2025-03-12 DIAGNOSIS — D50.8 IRON DEFICIENCY ANEMIA SECONDARY TO INADEQUATE DIETARY IRON INTAKE: ICD-10-CM

## 2025-03-12 LAB
ANION GAP SERPL CALC-SCNC: 11 MMOL/L (ref 0–18)
BASOPHILS # BLD AUTO: 0.06 X10(3) UL (ref 0–0.2)
BASOPHILS NFR BLD AUTO: 1 %
BUN BLD-MCNC: 50 MG/DL (ref 9–23)
CALCIUM BLD-MCNC: 8.7 MG/DL (ref 8.7–10.6)
CHLORIDE SERPL-SCNC: 112 MMOL/L (ref 98–112)
CO2 SERPL-SCNC: 18 MMOL/L (ref 21–32)
CREAT BLD-MCNC: 2.24 MG/DL
DEPRECATED HBV CORE AB SER IA-ACNC: 413 NG/ML
EGFRCR SERPLBLD CKD-EPI 2021: 30 ML/MIN/1.73M2 (ref 60–?)
EOSINOPHIL # BLD AUTO: 0.23 X10(3) UL (ref 0–0.7)
EOSINOPHIL NFR BLD AUTO: 3.9 %
ERYTHROCYTE [DISTWIDTH] IN BLOOD BY AUTOMATED COUNT: 18.4 %
FASTING STATUS PATIENT QL REPORTED: YES
GLUCOSE BLD-MCNC: 115 MG/DL (ref 70–99)
HCT VFR BLD AUTO: 32.9 %
HGB BLD-MCNC: 10.5 G/DL
IMM GRANULOCYTES # BLD AUTO: 0.01 X10(3) UL (ref 0–1)
IMM GRANULOCYTES NFR BLD: 0.2 %
IRON SATN MFR SERPL: 24 %
IRON SERPL-MCNC: 54 UG/DL
LYMPHOCYTES # BLD AUTO: 1.01 X10(3) UL (ref 1–4)
LYMPHOCYTES NFR BLD AUTO: 17.2 %
MCH RBC QN AUTO: 31.3 PG (ref 26–34)
MCHC RBC AUTO-ENTMCNC: 31.9 G/DL (ref 31–37)
MCV RBC AUTO: 97.9 FL
MONOCYTES # BLD AUTO: 0.43 X10(3) UL (ref 0.1–1)
MONOCYTES NFR BLD AUTO: 7.3 %
NEUTROPHILS # BLD AUTO: 4.13 X10 (3) UL (ref 1.5–7.7)
NEUTROPHILS # BLD AUTO: 4.13 X10(3) UL (ref 1.5–7.7)
NEUTROPHILS NFR BLD AUTO: 70.4 %
OSMOLALITY SERPL CALC.SUM OF ELEC: 306 MOSM/KG (ref 275–295)
PHOSPHATE SERPL-MCNC: 3.5 MG/DL (ref 2.4–5.1)
PLATELET # BLD AUTO: 345 10(3)UL (ref 150–450)
POTASSIUM SERPL-SCNC: 5 MMOL/L (ref 3.5–5.1)
RBC # BLD AUTO: 3.36 X10(6)UL
SODIUM SERPL-SCNC: 141 MMOL/L (ref 136–145)
TOTAL IRON BINDING CAPACITY: 227 UG/DL (ref 250–425)
TRANSFERRIN SERPL-MCNC: 165 MG/DL (ref 215–365)
WBC # BLD AUTO: 5.9 X10(3) UL (ref 4–11)

## 2025-03-12 PROCEDURE — 85025 COMPLETE CBC W/AUTO DIFF WBC: CPT

## 2025-03-12 PROCEDURE — 83550 IRON BINDING TEST: CPT

## 2025-03-12 PROCEDURE — 83540 ASSAY OF IRON: CPT

## 2025-03-12 PROCEDURE — 80048 BASIC METABOLIC PNL TOTAL CA: CPT

## 2025-03-12 PROCEDURE — 82728 ASSAY OF FERRITIN: CPT

## 2025-03-12 PROCEDURE — 36415 COLL VENOUS BLD VENIPUNCTURE: CPT

## 2025-03-12 PROCEDURE — 84100 ASSAY OF PHOSPHORUS: CPT

## 2025-03-20 ENCOUNTER — OFFICE VISIT (OUTPATIENT)
Dept: NEPHROLOGY | Facility: CLINIC | Age: 75
End: 2025-03-20
Payer: MEDICARE

## 2025-03-20 VITALS — BODY MASS INDEX: 23 KG/M2 | WEIGHT: 140 LBS | DIASTOLIC BLOOD PRESSURE: 50 MMHG | SYSTOLIC BLOOD PRESSURE: 114 MMHG

## 2025-03-20 DIAGNOSIS — R80.9 PROTEINURIA, UNSPECIFIED TYPE: ICD-10-CM

## 2025-03-20 DIAGNOSIS — N18.4 CKD (CHRONIC KIDNEY DISEASE) STAGE 4, GFR 15-29 ML/MIN (HCC): Primary | ICD-10-CM

## 2025-03-20 PROCEDURE — 99215 OFFICE O/P EST HI 40 MIN: CPT | Performed by: INTERNAL MEDICINE

## 2025-03-20 RX ORDER — CARVEDILOL 12.5 MG/1
12.5 TABLET ORAL 2 TIMES DAILY
COMMUNITY
Start: 2025-01-29

## 2025-03-20 RX ORDER — SPIRONOLACTONE 25 MG/1
25 TABLET ORAL DAILY
COMMUNITY
Start: 2025-02-22 | End: 2025-03-24

## 2025-03-20 RX ORDER — AMOXICILLIN 250 MG
1 CAPSULE ORAL 2 TIMES DAILY
COMMUNITY
Start: 2025-01-31

## 2025-03-20 RX ORDER — SPIRONOLACTONE 25 MG/1
25 TABLET ORAL DAILY
Qty: 90 TABLET | Refills: 3 | Status: SHIPPED | OUTPATIENT
Start: 2025-03-20

## 2025-03-20 NOTE — PROGRESS NOTES
Nephrology Progress Note      ASSESSMENT/PLAN:      1) CKD 4- longstanding CKD with initial Cr 2.0 mg/dl 2011 accompanied by modest subnephrotic range proteinuria most consistent with either prior insult such as postinfectious glomerulonephritis or a low-grade glomerulopathy such as membranous glomerulonephritis or FSGS- and predates onset of HTN / DM 2. Previous evaluation for other etiologies unrevealing. Cr > 3.5 mg/dl mid 2024 with addition of thiazide, now back to 2.3 mg/dl. PLAN- completed transplant eval at [facility] ([facility]); unclear if needs transplant ultimately. To focus on BP / DM mgmt. Check labs monthly; f/u in 3 months.    2) DM 2- well controlled on actos with A1C approx 6- CPM    3) HTN- BP excellent in 110/60 range on amlodipine 10 mg daily / coreg 25 mg bid / spironolactone 25 mg daily    4) Anemia- due to CKD; EPO for hgb < 9 g/dl     5) CV- s/p recent CABG [facility] 1/25- in hosp x 17 days, to start cardiac rehab       HPI:   Miguel Rees is a 74 year old male who presents for follow-up of   Chief Complaint   Patient presents with    Hypertension    Chronic Kidney Disease    Follow - Up     3 month f/u        Very pleasant 74-year-old male presents for follow-up of chronic kidney disease;     HISTORY:  Past Medical History:    Anemia    Diabetes mellitus (HCC)    Disorder of prostate    enlarged prostate    Glaucoma    minor    Hearing loss    High cholesterol    Type II or unspecified type diabetes mellitus without mention of complication, not stated as uncontrolled    Unspecified essential hypertension    Wears glasses      Past Surgical History:   Procedure Laterality Date    Cholecystectomy  11/2001    Colonoscopy  08/24/2019    Removal gallbladder        Family History   Problem Relation Age of Onset    Diabetes Father       Social History:   Social History     Socioeconomic History    Marital status:    Tobacco Use    Smoking status: Never    Smokeless tobacco: Never   Vaping Use     Vaping status: Never Used   Substance and Sexual Activity    Alcohol use: No    Drug use: No     Social Drivers of Health     Food Insecurity: No Food Insecurity (2/14/2025)    Received from [facility]    Food Insecurity     Currently or in the past 3 months, have you worried your food would run out before you had money to buy more?: No     In the past 12 months, have you run out of food or been unable to get more?: No   Transportation Needs: No Transportation Needs (2/14/2025)    Received from [facility]    Transportation Needs     Currently or in the past 3 months, has lack of transportation kept you from medical appointments, getting food or medicine, or providing care to a family member?: Unrecognized value     Medical Transportation Needs?: No    Received from [facility]    Housing Stability        Medications (Active prior to today's visit):  Current Outpatient Medications   Medication Sig Dispense Refill    carvedilol 12.5 MG Oral Tab Take 1 tablet (12.5 mg total) by mouth 2 (two) times daily.      sennosides-docusate 8.6-50 MG Oral Tab Take 1 tablet by mouth 2 (two) times daily.      spironolactone 25 MG Oral Tab Take 1 tablet (25 mg total) by mouth daily.      atorvastatin 20 MG Oral Tab Take 4 tablets (80 mg total) by mouth nightly.      amLODIPine 10 MG Oral Tab Take 1 tablet (10 mg total) by mouth daily. 90 tablet 1    Pioglitazone HCl 15 MG Oral Tab Take 1 tablet (15 mg total) by mouth daily.      Cholecalciferol (VITAMIN D) 1000 units Oral Tab Take by mouth. 2000mg       Vitamin B-12 1000 MCG Oral Tab Take 1 tablet (1,000 mcg total) by mouth daily.      aspirin 81 MG Oral Tab Take 1 tablet (81 mg total) by mouth daily.         Allergies:  Allergies   Allergen Reactions    Seasonal OTHER (SEE COMMENTS)     Sneezing       ROS:     Denies fever/chills  Denies wt loss/gain  Denies HA or visual changes  Denies CP or palpitations  Denies  SOB/cough/hemoptysis  Denies abd or flank pain  Denies N/V/D  Denies change in urinary habits or gross hematuria  Denies LE edema  Denies skin rashes/myalgias/arthralgias      PHYSICAL EXAM:   /50   Wt 140 lb (63.5 kg)   BMI 22.61 kg/m²   Wt Readings from Last 3 Encounters:   03/20/25 140 lb (63.5 kg)   11/21/24 148 lb 4 oz (67.2 kg)   07/02/24 142 lb (64.4 kg)     General: Alert and oriented in no apparent distress.  HEENT: No scleral icterus, MMM  Neck: Supple, no DEANNE or thyromegaly  Cardiac: Regular rate and rhythm, S1, S2 normal, no murmur or rub  Lungs: Clear without wheezes, rales, rhonchi.    Abdomen: Soft, non-tender. + bowel sounds, no palpable organomegaly  Extremities: Without clubbing, cyanosis or edema.  Neurologic: Alert and oriented, normal affect, cranial nerves grossly intact, moving all extremities  Skin: Warm and dry, no rashes      Maile Vincent MD  3/20/2025  131 PM

## 2025-03-21 ENCOUNTER — PATIENT MESSAGE (OUTPATIENT)
Dept: NEPHROLOGY | Facility: CLINIC | Age: 75
End: 2025-03-21

## 2025-03-21 DIAGNOSIS — D63.1 ANEMIA DUE TO STAGE 4 CHRONIC KIDNEY DISEASE (HCC): ICD-10-CM

## 2025-03-21 DIAGNOSIS — N18.4 ANEMIA DUE TO STAGE 4 CHRONIC KIDNEY DISEASE (HCC): ICD-10-CM

## 2025-03-21 DIAGNOSIS — N18.4 CKD (CHRONIC KIDNEY DISEASE) STAGE 4, GFR 15-29 ML/MIN (HCC): Primary | ICD-10-CM

## 2025-04-04 RX ORDER — AMLODIPINE BESYLATE 10 MG/1
10 TABLET ORAL DAILY
Qty: 90 TABLET | Refills: 1 | Status: SHIPPED | OUTPATIENT
Start: 2025-04-04

## 2025-04-15 ENCOUNTER — LAB ENCOUNTER (OUTPATIENT)
Dept: LAB | Age: 75
End: 2025-04-15
Attending: STUDENT IN AN ORGANIZED HEALTH CARE EDUCATION/TRAINING PROGRAM
Payer: MEDICARE

## 2025-04-15 DIAGNOSIS — N18.4 CKD (CHRONIC KIDNEY DISEASE) STAGE 4, GFR 15-29 ML/MIN (HCC): ICD-10-CM

## 2025-04-15 DIAGNOSIS — D63.1 ANEMIA DUE TO STAGE 4 CHRONIC KIDNEY DISEASE (HCC): ICD-10-CM

## 2025-04-15 DIAGNOSIS — N18.4 ANEMIA DUE TO STAGE 4 CHRONIC KIDNEY DISEASE (HCC): ICD-10-CM

## 2025-04-15 LAB
ANION GAP SERPL CALC-SCNC: 9 MMOL/L (ref 0–18)
BASOPHILS # BLD AUTO: 0.06 X10(3) UL (ref 0–0.2)
BASOPHILS NFR BLD AUTO: 1.2 %
BUN BLD-MCNC: 48 MG/DL (ref 9–23)
CALCIUM BLD-MCNC: 9.5 MG/DL (ref 8.7–10.6)
CHLORIDE SERPL-SCNC: 110 MMOL/L (ref 98–112)
CO2 SERPL-SCNC: 24 MMOL/L (ref 21–32)
CREAT BLD-MCNC: 2.2 MG/DL (ref 0.7–1.3)
EGFRCR SERPLBLD CKD-EPI 2021: 31 ML/MIN/1.73M2 (ref 60–?)
EOSINOPHIL # BLD AUTO: 0.2 X10(3) UL (ref 0–0.7)
EOSINOPHIL NFR BLD AUTO: 3.9 %
ERYTHROCYTE [DISTWIDTH] IN BLOOD BY AUTOMATED COUNT: 15.9 %
FASTING STATUS PATIENT QL REPORTED: YES
GLUCOSE BLD-MCNC: 107 MG/DL (ref 70–99)
HCT VFR BLD AUTO: 31 % (ref 39–53)
HGB BLD-MCNC: 10.2 G/DL (ref 13–17.5)
IMM GRANULOCYTES # BLD AUTO: 0.01 X10(3) UL (ref 0–1)
IMM GRANULOCYTES NFR BLD: 0.2 %
LYMPHOCYTES # BLD AUTO: 1.25 X10(3) UL (ref 1–4)
LYMPHOCYTES NFR BLD AUTO: 24.4 %
MCH RBC QN AUTO: 31.7 PG (ref 26–34)
MCHC RBC AUTO-ENTMCNC: 32.9 G/DL (ref 31–37)
MCV RBC AUTO: 96.3 FL (ref 80–100)
MONOCYTES # BLD AUTO: 0.5 X10(3) UL (ref 0.1–1)
MONOCYTES NFR BLD AUTO: 9.7 %
NEUTROPHILS # BLD AUTO: 3.11 X10 (3) UL (ref 1.5–7.7)
NEUTROPHILS # BLD AUTO: 3.11 X10(3) UL (ref 1.5–7.7)
NEUTROPHILS NFR BLD AUTO: 60.6 %
OSMOLALITY SERPL CALC.SUM OF ELEC: 309 MOSM/KG (ref 275–295)
PLATELET # BLD AUTO: 284 10(3)UL (ref 150–450)
POTASSIUM SERPL-SCNC: 4.8 MMOL/L (ref 3.5–5.1)
RBC # BLD AUTO: 3.22 X10(6)UL (ref 3.8–5.8)
SODIUM SERPL-SCNC: 143 MMOL/L (ref 136–145)
WBC # BLD AUTO: 5.1 X10(3) UL (ref 4–11)

## 2025-04-15 PROCEDURE — 80048 BASIC METABOLIC PNL TOTAL CA: CPT

## 2025-04-15 PROCEDURE — 36415 COLL VENOUS BLD VENIPUNCTURE: CPT

## 2025-04-15 PROCEDURE — 85025 COMPLETE CBC W/AUTO DIFF WBC: CPT

## 2025-05-20 ENCOUNTER — LAB ENCOUNTER (OUTPATIENT)
Dept: LAB | Age: 75
End: 2025-05-20
Attending: INTERNAL MEDICINE
Payer: MEDICARE

## 2025-05-20 DIAGNOSIS — D63.1 ANEMIA DUE TO STAGE 4 CHRONIC KIDNEY DISEASE (HCC): ICD-10-CM

## 2025-05-20 DIAGNOSIS — I15.9 SECONDARY HYPERTENSION: ICD-10-CM

## 2025-05-20 DIAGNOSIS — N18.4 CKD (CHRONIC KIDNEY DISEASE) STAGE 4, GFR 15-29 ML/MIN (HCC): ICD-10-CM

## 2025-05-20 DIAGNOSIS — R80.9 PROTEINURIA, UNSPECIFIED TYPE: ICD-10-CM

## 2025-05-20 DIAGNOSIS — N18.4 ANEMIA DUE TO STAGE 4 CHRONIC KIDNEY DISEASE (HCC): ICD-10-CM

## 2025-05-20 LAB
ANION GAP SERPL CALC-SCNC: 7 MMOL/L (ref 0–18)
BASOPHILS # BLD AUTO: 0.04 X10(3) UL (ref 0–0.2)
BASOPHILS NFR BLD AUTO: 0.7 %
BUN BLD-MCNC: 44 MG/DL (ref 9–23)
CALCIUM BLD-MCNC: 9.1 MG/DL (ref 8.7–10.6)
CHLORIDE SERPL-SCNC: 109 MMOL/L (ref 98–112)
CO2 SERPL-SCNC: 24 MMOL/L (ref 21–32)
CREAT BLD-MCNC: 2.32 MG/DL (ref 0.7–1.3)
EGFRCR SERPLBLD CKD-EPI 2021: 29 ML/MIN/1.73M2 (ref 60–?)
EOSINOPHIL # BLD AUTO: 0.22 X10(3) UL (ref 0–0.7)
EOSINOPHIL NFR BLD AUTO: 3.8 %
ERYTHROCYTE [DISTWIDTH] IN BLOOD BY AUTOMATED COUNT: 13.7 %
FASTING STATUS PATIENT QL REPORTED: YES
GLUCOSE BLD-MCNC: 101 MG/DL (ref 70–99)
HCT VFR BLD AUTO: 32.6 % (ref 39–53)
HGB BLD-MCNC: 10.4 G/DL (ref 13–17.5)
IMM GRANULOCYTES # BLD AUTO: 0.01 X10(3) UL (ref 0–1)
IMM GRANULOCYTES NFR BLD: 0.2 %
LYMPHOCYTES # BLD AUTO: 1.34 X10(3) UL (ref 1–4)
LYMPHOCYTES NFR BLD AUTO: 23.1 %
MCH RBC QN AUTO: 31.3 PG (ref 26–34)
MCHC RBC AUTO-ENTMCNC: 31.9 G/DL (ref 31–37)
MCV RBC AUTO: 98.2 FL (ref 80–100)
MONOCYTES # BLD AUTO: 0.5 X10(3) UL (ref 0.1–1)
MONOCYTES NFR BLD AUTO: 8.6 %
NEUTROPHILS # BLD AUTO: 3.69 X10 (3) UL (ref 1.5–7.7)
NEUTROPHILS # BLD AUTO: 3.69 X10(3) UL (ref 1.5–7.7)
NEUTROPHILS NFR BLD AUTO: 63.6 %
OSMOLALITY SERPL CALC.SUM OF ELEC: 301 MOSM/KG (ref 275–295)
PHOSPHATE SERPL-MCNC: 3.5 MG/DL (ref 2.4–5.1)
PLATELET # BLD AUTO: 286 10(3)UL (ref 150–450)
POTASSIUM SERPL-SCNC: 5.2 MMOL/L (ref 3.5–5.1)
RBC # BLD AUTO: 3.32 X10(6)UL (ref 3.8–5.8)
SODIUM SERPL-SCNC: 140 MMOL/L (ref 136–145)
WBC # BLD AUTO: 5.8 X10(3) UL (ref 4–11)

## 2025-05-20 PROCEDURE — 80048 BASIC METABOLIC PNL TOTAL CA: CPT

## 2025-05-20 PROCEDURE — 85025 COMPLETE CBC W/AUTO DIFF WBC: CPT

## 2025-05-20 PROCEDURE — 84100 ASSAY OF PHOSPHORUS: CPT

## 2025-05-20 PROCEDURE — 36415 COLL VENOUS BLD VENIPUNCTURE: CPT

## 2025-06-21 ENCOUNTER — LAB ENCOUNTER (OUTPATIENT)
Dept: LAB | Age: 75
End: 2025-06-21
Attending: INTERNAL MEDICINE
Payer: MEDICARE

## 2025-06-21 DIAGNOSIS — N18.4 CKD (CHRONIC KIDNEY DISEASE) STAGE 4, GFR 15-29 ML/MIN (HCC): ICD-10-CM

## 2025-06-21 DIAGNOSIS — D63.1 ANEMIA DUE TO STAGE 4 CHRONIC KIDNEY DISEASE (HCC): ICD-10-CM

## 2025-06-21 DIAGNOSIS — N18.4 ANEMIA DUE TO STAGE 4 CHRONIC KIDNEY DISEASE (HCC): ICD-10-CM

## 2025-06-21 LAB
ANION GAP SERPL CALC-SCNC: 9 MMOL/L (ref 0–18)
BASOPHILS # BLD AUTO: 0.05 X10(3) UL (ref 0–0.2)
BASOPHILS NFR BLD AUTO: 1 %
BUN BLD-MCNC: 55 MG/DL (ref 9–23)
CALCIUM BLD-MCNC: 9.1 MG/DL (ref 8.7–10.6)
CHLORIDE SERPL-SCNC: 106 MMOL/L (ref 98–112)
CO2 SERPL-SCNC: 24 MMOL/L (ref 21–32)
CREAT BLD-MCNC: 2.25 MG/DL (ref 0.7–1.3)
EGFRCR SERPLBLD CKD-EPI 2021: 30 ML/MIN/1.73M2 (ref 60–?)
EOSINOPHIL # BLD AUTO: 0.19 X10(3) UL (ref 0–0.7)
EOSINOPHIL NFR BLD AUTO: 3.6 %
ERYTHROCYTE [DISTWIDTH] IN BLOOD BY AUTOMATED COUNT: 13 %
FASTING STATUS PATIENT QL REPORTED: YES
GLUCOSE BLD-MCNC: 106 MG/DL (ref 70–99)
HCT VFR BLD AUTO: 30.9 % (ref 39–53)
HGB BLD-MCNC: 10 G/DL (ref 13–17.5)
IMM GRANULOCYTES # BLD AUTO: 0.01 X10(3) UL (ref 0–1)
IMM GRANULOCYTES NFR BLD: 0.2 %
LYMPHOCYTES # BLD AUTO: 1.27 X10(3) UL (ref 1–4)
LYMPHOCYTES NFR BLD AUTO: 24.3 %
MCH RBC QN AUTO: 31.4 PG (ref 26–34)
MCHC RBC AUTO-ENTMCNC: 32.4 G/DL (ref 31–37)
MCV RBC AUTO: 97.2 FL (ref 80–100)
MONOCYTES # BLD AUTO: 0.49 X10(3) UL (ref 0.1–1)
MONOCYTES NFR BLD AUTO: 9.4 %
NEUTROPHILS # BLD AUTO: 3.21 X10 (3) UL (ref 1.5–7.7)
NEUTROPHILS # BLD AUTO: 3.21 X10(3) UL (ref 1.5–7.7)
NEUTROPHILS NFR BLD AUTO: 61.5 %
OSMOLALITY SERPL CALC.SUM OF ELEC: 304 MOSM/KG (ref 275–295)
PLATELET # BLD AUTO: 265 10(3)UL (ref 150–450)
POTASSIUM SERPL-SCNC: 4.4 MMOL/L (ref 3.5–5.1)
RBC # BLD AUTO: 3.18 X10(6)UL (ref 3.8–5.8)
SODIUM SERPL-SCNC: 139 MMOL/L (ref 136–145)
WBC # BLD AUTO: 5.2 X10(3) UL (ref 4–11)

## 2025-06-21 PROCEDURE — 36415 COLL VENOUS BLD VENIPUNCTURE: CPT

## 2025-06-21 PROCEDURE — 80048 BASIC METABOLIC PNL TOTAL CA: CPT

## 2025-06-21 PROCEDURE — 85025 COMPLETE CBC W/AUTO DIFF WBC: CPT

## 2025-06-26 ENCOUNTER — OFFICE VISIT (OUTPATIENT)
Dept: NEPHROLOGY | Facility: CLINIC | Age: 75
End: 2025-06-26
Payer: MEDICARE

## 2025-06-26 VITALS — SYSTOLIC BLOOD PRESSURE: 120 MMHG | WEIGHT: 138 LBS | BODY MASS INDEX: 22 KG/M2 | DIASTOLIC BLOOD PRESSURE: 50 MMHG

## 2025-06-26 DIAGNOSIS — D63.1 ANEMIA DUE TO STAGE 4 CHRONIC KIDNEY DISEASE (HCC): ICD-10-CM

## 2025-06-26 DIAGNOSIS — N18.4 CKD (CHRONIC KIDNEY DISEASE) STAGE 4, GFR 15-29 ML/MIN (HCC): Primary | ICD-10-CM

## 2025-06-26 DIAGNOSIS — I10 PRIMARY HYPERTENSION: ICD-10-CM

## 2025-06-26 DIAGNOSIS — N18.4 ANEMIA DUE TO STAGE 4 CHRONIC KIDNEY DISEASE (HCC): ICD-10-CM

## 2025-06-26 PROCEDURE — 99213 OFFICE O/P EST LOW 20 MIN: CPT | Performed by: INTERNAL MEDICINE

## 2025-06-26 NOTE — PROGRESS NOTES
Nephrology Progress Note      ASSESSMENT/PLAN:      1) CKD 4- longstanding CKD with initial Cr 2.0 mg/dl 2011 accompanied by modest subnephrotic range proteinuria most consistent with either prior insult such as postinfectious glomerulonephritis or a low-grade glomerulopathy such as membranous glomerulonephritis or FSGS- and predates onset of HTN / DM 2. Previous evaluation for other etiologies unrevealing. Cr > 3.5 mg/dl mid 2024 with addition of thiazide, now back to 2.3 mg/dl. PLAN- to be placed hopefully on [facility] transplant list pending stress test ([facility]); unclear if needs transplant ultimately. To focus on BP / DM mgmt. Check labs q2 months; f/u 12/25    2) DM 2- well controlled on actos with A1C approx 6- CPM    3) HTN- BP excellent in 110/60 range on amlodipine 10 mg daily / coreg 25 mg bid / spironolactone 25 mg daily    4) Anemia- due to CKD; EPO for hgb < 9 g/dl     5) CV- s/p recent CABG [facility] 1/25- in hosp x 17 days; finishing up cardiac rehab       HPI:   Miguel Rees is a 75 year old male who presents for follow-up of   Chief Complaint   Patient presents with    Chronic Kidney Disease    Hypertension       Very pleasant 75-year-old male presents for follow-up of chronic kidney disease;     HISTORY:  Past Medical History:    Anemia    Diabetes mellitus (HCC)    Disorder of prostate    enlarged prostate    Glaucoma    minor    Hearing loss    High cholesterol    Type II or unspecified type diabetes mellitus without mention of complication, not stated as uncontrolled    Unspecified essential hypertension    Wears glasses      Past Surgical History:   Procedure Laterality Date    Cholecystectomy  11/2001    Colonoscopy  08/24/2019    Removal gallbladder        Family History   Problem Relation Age of Onset    Diabetes Father       Social History:   Social History     Socioeconomic History    Marital status:    Tobacco Use    Smoking status: Never    Smokeless tobacco: Never   Vaping Use     Vaping status: Never Used   Substance and Sexual Activity    Alcohol use: No    Drug use: No     Social Drivers of Health     Food Insecurity: No Food Insecurity (2/14/2025)    Received from [facility]    Food Insecurity     Currently or in the past 3 months, have you worried your food would run out before you had money to buy more?: No     In the past 12 months, have you run out of food or been unable to get more?: No   Transportation Needs: No Transportation Needs (2/14/2025)    Received from [facility]    Transportation Needs     Currently or in the past 3 months, has lack of transportation kept you from medical appointments, getting food or medicine, or providing care to a family member?: No    Received from [facility]    Housing Stability        Medications (Active prior to today's visit):  Current Outpatient Medications   Medication Sig Dispense Refill    amLODIPine 10 MG Oral Tab Take 1 tablet (10 mg total) by mouth daily. 90 tablet 1    carvedilol 12.5 MG Oral Tab Take 1 tablet (12.5 mg total) by mouth 2 (two) times daily.      spironolactone 25 MG Oral Tab Take 1 tablet (25 mg total) by mouth daily. 90 tablet 3    atorvastatin 20 MG Oral Tab Take 4 tablets (80 mg total) by mouth nightly.      Pioglitazone HCl 15 MG Oral Tab Take 1 tablet (15 mg total) by mouth daily.      Cholecalciferol (VITAMIN D) 1000 units Oral Tab Take by mouth. 2000mg       Vitamin B-12 1000 MCG Oral Tab Take 1 tablet (1,000 mcg total) by mouth daily.      aspirin 81 MG Oral Tab Take 1 tablet (81 mg total) by mouth daily.      sennosides-docusate 8.6-50 MG Oral Tab Take 1 tablet by mouth 2 (two) times daily. (Patient not taking: Reported on 6/26/2025)         Allergies:  Allergies   Allergen Reactions    Seasonal OTHER (SEE COMMENTS)     Sneezing       ROS:     Denies fever/chills  Denies wt loss/gain  Denies HA or visual changes  Denies CP or palpitations  Denies  SOB/cough/hemoptysis  Denies abd or flank pain  Denies N/V/D  Denies change in urinary habits or gross hematuria  Denies LE edema  Denies skin rashes/myalgias/arthralgias      PHYSICAL EXAM:   /50 (BP Location: Right arm, Patient Position: Sitting, Cuff Size: adult)   Wt 138 lb (62.6 kg)   BMI 22.28 kg/m²   Wt Readings from Last 3 Encounters:   06/26/25 138 lb (62.6 kg)   03/20/25 140 lb (63.5 kg)   11/21/24 148 lb 4 oz (67.2 kg)     General: Alert and oriented in no apparent distress.  HEENT: No scleral icterus, MMM  Neck: Supple, no DEANNE or thyromegaly  Cardiac: Regular rate and rhythm, S1, S2 normal, no murmur or rub  Lungs: Clear without wheezes, rales, rhonchi.    Abdomen: Soft, non-tender. + bowel sounds, no palpable organomegaly  Extremities: Without clubbing, cyanosis or edema.  Neurologic: Alert and oriented, normal affect, cranial nerves grossly intact, moving all extremities  Skin: Warm and dry, no rashes      Maile Vincent MD  3/20/2025  131 PM